# Patient Record
Sex: MALE | Race: WHITE | NOT HISPANIC OR LATINO | Employment: OTHER | URBAN - METROPOLITAN AREA
[De-identification: names, ages, dates, MRNs, and addresses within clinical notes are randomized per-mention and may not be internally consistent; named-entity substitution may affect disease eponyms.]

---

## 2023-03-29 LAB
ANION GAP IN SER/PLAS: 11 MMOL/L (ref 10–20)
CALCIUM (MG/DL) IN SER/PLAS: 9.4 MG/DL (ref 8.6–10.3)
CARBON DIOXIDE, TOTAL (MMOL/L) IN SER/PLAS: 30 MMOL/L (ref 21–32)
CHLORIDE (MMOL/L) IN SER/PLAS: 102 MMOL/L (ref 98–107)
CREATININE (MG/DL) IN SER/PLAS: 0.81 MG/DL (ref 0.5–1.3)
GFR MALE: >90 ML/MIN/1.73M2
GLUCOSE (MG/DL) IN SER/PLAS: 94 MG/DL (ref 74–99)
POTASSIUM (MMOL/L) IN SER/PLAS: 4.3 MMOL/L (ref 3.5–5.3)
SODIUM (MMOL/L) IN SER/PLAS: 139 MMOL/L (ref 136–145)
UREA NITROGEN (MG/DL) IN SER/PLAS: 11 MG/DL (ref 6–23)

## 2023-04-20 DIAGNOSIS — I10 PRIMARY HYPERTENSION: Primary | ICD-10-CM

## 2023-04-20 RX ORDER — LISINOPRIL 30 MG/1
1 TABLET ORAL DAILY
COMMUNITY
End: 2023-04-20 | Stop reason: SDUPTHER

## 2023-04-20 NOTE — TELEPHONE ENCOUNTER
Patient left voicemail on provider refill line for:    Name of medication & dose:  Lisinopril 30mg   Quantity & refills requested: as per last time  Amount of medication remaining:  about 1 week  If out of medication, for how long?      Last office visit:  10/20/22  Last labs (if applicable):    Future appointment?  5/1/23    Pharmacy verified.  [facility]   Allergies updated.       The patient was informed the requested medication request will be processed within 3 business days unless they are contacted by a staff member to advise otherwise.

## 2023-04-21 RX ORDER — LISINOPRIL 30 MG/1
30 TABLET ORAL DAILY
Qty: 90 TABLET | Refills: 0 | Status: SHIPPED | OUTPATIENT
Start: 2023-04-21 | End: 2023-05-01 | Stop reason: SDUPTHER

## 2023-05-01 ENCOUNTER — OFFICE VISIT (OUTPATIENT)
Dept: PRIMARY CARE | Facility: CLINIC | Age: 63
End: 2023-05-01
Payer: COMMERCIAL

## 2023-05-01 VITALS
OXYGEN SATURATION: 96 % | BODY MASS INDEX: 40.57 KG/M2 | SYSTOLIC BLOOD PRESSURE: 130 MMHG | WEIGHT: 295 LBS | DIASTOLIC BLOOD PRESSURE: 81 MMHG | HEART RATE: 81 BPM

## 2023-05-01 DIAGNOSIS — F41.9 ANXIETY: ICD-10-CM

## 2023-05-01 DIAGNOSIS — I10 PRIMARY HYPERTENSION: Primary | ICD-10-CM

## 2023-05-01 DIAGNOSIS — E66.01 OBESITY, MORBID, BMI 40.0-49.9 (MULTI): ICD-10-CM

## 2023-05-01 DIAGNOSIS — R63.5 WEIGHT GAIN: ICD-10-CM

## 2023-05-01 PROBLEM — R45.89 DEPRESSED MOOD: Status: ACTIVE | Noted: 2023-05-01

## 2023-05-01 PROBLEM — M79.10 MYALGIA: Status: RESOLVED | Noted: 2023-05-01 | Resolved: 2023-05-01

## 2023-05-01 PROBLEM — M99.09 SEGMENTAL AND SOMATIC DYSFUNCTION: Status: RESOLVED | Noted: 2023-05-01 | Resolved: 2023-05-01

## 2023-05-01 PROBLEM — M54.6 THORACIC SPINE PAIN: Status: RESOLVED | Noted: 2023-05-01 | Resolved: 2023-05-01

## 2023-05-01 PROBLEM — R79.89 ABNORMAL TSH: Status: RESOLVED | Noted: 2023-05-01 | Resolved: 2023-05-01

## 2023-05-01 PROBLEM — R79.89 LOW TESTOSTERONE IN MALE: Status: RESOLVED | Noted: 2023-05-01 | Resolved: 2023-05-01

## 2023-05-01 PROBLEM — M25.642 STIFFNESS OF FINGER JOINT OF LEFT HAND: Status: RESOLVED | Noted: 2023-05-01 | Resolved: 2023-05-01

## 2023-05-01 PROBLEM — M53.3 SACRAL BACK PAIN: Status: ACTIVE | Noted: 2023-05-01

## 2023-05-01 PROBLEM — H40.1131 PRIMARY OPEN ANGLE GLAUCOMA (POAG) OF BOTH EYES, MILD STAGE: Status: ACTIVE | Noted: 2023-05-01

## 2023-05-01 PROBLEM — F90.9 ADHD: Status: ACTIVE | Noted: 2023-05-01

## 2023-05-01 PROBLEM — M54.2 NECK PAIN: Status: RESOLVED | Noted: 2023-05-01 | Resolved: 2023-05-01

## 2023-05-01 PROBLEM — M79.9 POSTURAL STRAIN: Status: RESOLVED | Noted: 2023-05-01 | Resolved: 2023-05-01

## 2023-05-01 PROBLEM — M40.00 KYPHOSIS (ACQUIRED) (POSTURAL): Status: ACTIVE | Noted: 2023-05-01

## 2023-05-01 PROBLEM — N52.9 ERECTILE DYSFUNCTION: Status: ACTIVE | Noted: 2023-05-01

## 2023-05-01 PROBLEM — M89.9 SCAPULAR DYSFUNCTION: Status: RESOLVED | Noted: 2023-05-01 | Resolved: 2023-05-01

## 2023-05-01 PROCEDURE — 3075F SYST BP GE 130 - 139MM HG: CPT | Performed by: FAMILY MEDICINE

## 2023-05-01 PROCEDURE — 99214 OFFICE O/P EST MOD 30 MIN: CPT | Performed by: FAMILY MEDICINE

## 2023-05-01 PROCEDURE — 3079F DIAST BP 80-89 MM HG: CPT | Performed by: FAMILY MEDICINE

## 2023-05-01 PROCEDURE — 1036F TOBACCO NON-USER: CPT | Performed by: FAMILY MEDICINE

## 2023-05-01 RX ORDER — ESCITALOPRAM OXALATE 10 MG/1
1 TABLET ORAL DAILY
COMMUNITY
Start: 2022-04-18 | End: 2023-05-01 | Stop reason: SDUPTHER

## 2023-05-01 RX ORDER — ESCITALOPRAM OXALATE 10 MG/1
5 TABLET ORAL DAILY
Qty: 30 TABLET | Refills: 0 | Status: SHIPPED | OUTPATIENT
Start: 2023-05-01 | End: 2024-03-08 | Stop reason: ALTCHOICE

## 2023-05-01 RX ORDER — LISINOPRIL 30 MG/1
30 TABLET ORAL DAILY
Qty: 90 TABLET | Refills: 1 | Status: SHIPPED | OUTPATIENT
Start: 2023-05-01 | End: 2023-07-28 | Stop reason: SDUPTHER

## 2023-05-01 RX ORDER — TIMOLOL MALEATE 5 MG/ML
SOLUTION OPHTHALMIC
COMMUNITY
Start: 2022-06-23 | End: 2024-02-27 | Stop reason: SDUPTHER

## 2023-05-01 RX ORDER — SILDENAFIL 100 MG/1
TABLET, FILM COATED ORAL
COMMUNITY
End: 2024-03-08 | Stop reason: SDUPTHER

## 2023-05-01 RX ORDER — TRAVOPROST OPHTHALMIC SOLUTION 0.04 MG/ML
SOLUTION OPHTHALMIC
COMMUNITY
Start: 2021-12-08

## 2023-05-01 RX ORDER — SERTRALINE HYDROCHLORIDE 50 MG/1
50 TABLET, FILM COATED ORAL DAILY
Qty: 90 TABLET | Refills: 0 | Status: SHIPPED | OUTPATIENT
Start: 2023-05-01 | End: 2023-07-28 | Stop reason: SDUPTHER

## 2023-05-01 ASSESSMENT — PATIENT HEALTH QUESTIONNAIRE - PHQ9
2. FEELING DOWN, DEPRESSED OR HOPELESS: NOT AT ALL
1. LITTLE INTEREST OR PLEASURE IN DOING THINGS: NOT AT ALL
SUM OF ALL RESPONSES TO PHQ9 QUESTIONS 1 AND 2: 0

## 2023-07-28 ENCOUNTER — OFFICE VISIT (OUTPATIENT)
Dept: PRIMARY CARE | Facility: CLINIC | Age: 63
End: 2023-07-28
Payer: COMMERCIAL

## 2023-07-28 VITALS
WEIGHT: 287 LBS | SYSTOLIC BLOOD PRESSURE: 121 MMHG | DIASTOLIC BLOOD PRESSURE: 79 MMHG | BODY MASS INDEX: 39.47 KG/M2 | OXYGEN SATURATION: 96 % | HEART RATE: 71 BPM

## 2023-07-28 DIAGNOSIS — I10 PRIMARY HYPERTENSION: Primary | ICD-10-CM

## 2023-07-28 DIAGNOSIS — F41.9 ANXIETY: ICD-10-CM

## 2023-07-28 PROCEDURE — 99213 OFFICE O/P EST LOW 20 MIN: CPT | Performed by: FAMILY MEDICINE

## 2023-07-28 PROCEDURE — 3074F SYST BP LT 130 MM HG: CPT | Performed by: FAMILY MEDICINE

## 2023-07-28 PROCEDURE — 1036F TOBACCO NON-USER: CPT | Performed by: FAMILY MEDICINE

## 2023-07-28 PROCEDURE — 3078F DIAST BP <80 MM HG: CPT | Performed by: FAMILY MEDICINE

## 2023-07-28 RX ORDER — LISINOPRIL 30 MG/1
30 TABLET ORAL DAILY
Qty: 90 TABLET | Refills: 1 | Status: SHIPPED | OUTPATIENT
Start: 2023-07-28 | End: 2023-07-28

## 2023-07-28 RX ORDER — SERTRALINE HYDROCHLORIDE 100 MG/1
100 TABLET, FILM COATED ORAL DAILY
Qty: 90 TABLET | Refills: 1 | Status: SHIPPED | OUTPATIENT
Start: 2023-07-28 | End: 2023-07-28

## 2023-07-28 NOTE — PROGRESS NOTES
Subjective   Patient ID: Reji Lewis is a 63 y.o. male who presents for Hypertension (BP follow up).  He has been trying to walk more but he has not cut his screen time at all.  He has stopped the lexapro and has been on zoloft now for 3 months.  No side effects.  He thinks he has had improvement, but some of that has been CBD oil and also his wife has been better due to her own med adjustments, which helps his mood.  He is at least 25% improved but is leary of going up on the zoloft.  He wants to stay on the same dose, but also wants to increase.      He takes his BP meds daily.        Histories reviewed      Objective   /79   Pulse 71   Wt 130 kg (287 lb)   SpO2 96%   BMI 39.47 kg/m²    Physical Exam  Alert obese adult man, NAD  Affect pleasant.    Heart RRR no murmur  Lungs CTA bilat    Problem List Items Addressed This Visit       Anxiety    Relevant Medications    sertraline (Zoloft) 100 mg tablet    Primary hypertension - Primary    Relevant Medications    lisinopril 30 mg tablet

## 2023-08-23 PROBLEM — E66.812 CLASS 2 SEVERE OBESITY DUE TO EXCESS CALORIES WITH SERIOUS COMORBIDITY AND BODY MASS INDEX (BMI) OF 39.0 TO 39.9 IN ADULT: Status: ACTIVE | Noted: 2023-08-23

## 2023-08-23 PROBLEM — E66.01 CLASS 2 SEVERE OBESITY DUE TO EXCESS CALORIES WITH SERIOUS COMORBIDITY AND BODY MASS INDEX (BMI) OF 39.0 TO 39.9 IN ADULT (MULTI): Status: ACTIVE | Noted: 2023-08-23

## 2023-08-23 PROBLEM — I10 ESSENTIAL HYPERTENSION, BENIGN: Status: ACTIVE | Noted: 2023-08-23

## 2023-08-23 PROBLEM — G89.29 CHRONIC LEFT SHOULDER PAIN: Status: ACTIVE | Noted: 2023-08-23

## 2023-08-23 PROBLEM — M25.512 CHRONIC LEFT SHOULDER PAIN: Status: ACTIVE | Noted: 2023-08-23

## 2023-08-23 RX ORDER — DEXTROAMPHETAMINE SACCHARATE, AMPHETAMINE ASPARTATE MONOHYDRATE, DEXTROAMPHETAMINE SULFATE AND AMPHETAMINE SULFATE 7.5; 7.5; 7.5; 7.5 MG/1; MG/1; MG/1; MG/1
1 CAPSULE, EXTENDED RELEASE ORAL DAILY
COMMUNITY
End: 2024-03-08 | Stop reason: ALTCHOICE

## 2023-10-01 ASSESSMENT — ENCOUNTER SYMPTOMS
APPETITE CHANGE: 0
WOUND: 0
DIZZINESS: 0
NAUSEA: 0
DYSURIA: 0
SHORTNESS OF BREATH: 0
FEVER: 0
ENDOCRINE COMMENTS: HX HYPOTHYROIDISM
VOMITING: 0
WEAKNESS: 0
FATIGUE: 0
CONFUSION: 0

## 2023-10-01 NOTE — PROGRESS NOTES
"Subjective   Patient ID: Reji Lewis is a 63 y.o. male who presents today for the examination and treatment of pain involving their R shoulder, neck and low back pain.    This is a visit 8/20 of the calendar year. [facility].     [facility] - Reji presents today with neck and R shoulder pain. He notes he has been spending a lot of time sitting in a car/plane since traveling to [place], which is contributing to his neck/shoulder pain. Today's treatment will focus on his neck and R shoulder regions and check for any joint restrictions and/or muscular imbalances.     He will be seeing an ophthalmologist in January of 2024 for a follow up with his diagnosis of glaucoma.     Patient describes the pain as stiffness and tightness, and rates the current pain 5 out of 10 (10 being the worst).     INITIAL INTAKE/SUBJECTIVE 09/29/21: He explains that this pain started approximately 1 year ago when he started to notice right shoulder issues. He was scheduled for an MRI of the right shoulder when he was living in [place], however he was unable to tolerate the stress and the coordination of getting there and making it through the test. Pain then seemed to shift to the base of the neck and now it is more pronounced on the left side. Explains that he cannot lay on the right side because the left sided pain will get worse. He does have some \"tingling\" sensation at the left base of the neck but the CT junction as well as into the left first and second digits. He notices this specifically when looking down at his phone while in his recliner. The only treatment he has tried so far was a massage approximately 1 month ago which \"felt good\" and gave mild temporary relief. There is no current sharp pain even with active range of motion but he reports the sensation of \"stiffness in his vertebrae\".    Social history: Reji and his wife just moved here approximately 3 months ago to be closer to their son and daughter-in-law. He does note " that his pain has gotten worse around this timeframe likely due to stress which has come with moving and is wife starting a new job. 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     Past medical history: He does have high blood pressure and is medicated for this, ADD which she is medicated, does take baclofen as needed for muscle pain. No other significant past medical history reported including surgeries and chronic diseases. Low testosterone, gets hormone replacement. Hypothyroidism. Glaucoma.        Review of Systems   Constitutional:  Negative for appetite change, fatigue and fever.   HENT:  Negative for congestion and hearing loss.    Eyes:  Negative for visual disturbance.        Hx Glaucoma   Respiratory:  Negative for shortness of breath.    Cardiovascular:  Negative for chest pain.   Gastrointestinal:  Negative for nausea and vomiting.   Endocrine:        Hx Hypothyroidism   Genitourinary:  Negative for dysuria.   Skin:  Negative for rash and wound.   Neurological:  Negative for dizziness and weakness.   Psychiatric/Behavioral:  Negative for confusion.    All other systems reviewed and are negative.    Objective   Observation : normal gait and forward head posture. Slight difficulty with transitional movements. Forward flexed posture.     Physical Exam    Examination findings (palpation & ROM): BL suboccipitals, cervical paraspinals, scalenes, levator scapulae.          Segmental joint dysfunction was identified in the following areas using motion palpation and/or pain provocation assessment:  Cervical: C0-C5 (Supine, gentle impulse)  Thoracic: T4-T9 (prone)  Lumbopelvic: L3-S1, right and left SI joint (side-posture & drop)    Today's treatment:  Performed spinal manipulation to the regions of segmental dysfunction identified on examination using age-appropriate and injury specific force, and manual diversified technique.     Brief supine soft tissue  manipulation was performed including IASTM (instrument assisted soft tissue mobilization), MFR (Myofacial Release) and IC (ischemic compression) to the hypertonic paraspinal muscles including R>L suboccipitals, R>L cervical paraspinals, R>L scalenes, R>L levator scapulae to patient tolerance. Prone IC and MFR to BL thoracic/lumbar paraspinals, rhomboids, and middle trapezius, SIJ, and upper gluteals.     Treatment Plan:   The patient and I discussed the risks and benefits of chiropractic care. Based on the patient's subjective complaints along with the examination findings, it is advised that a course of chiropractic treatment by initiated. Consent for care was given both written and orally by the patient. The patient tolerated today's treatment with little or no additional discomfort and was instructed to contact the office for questions or concerns.     Treatment Frequency: Will see/treat patient every 2-4 weeks as therapeutic benefit is sustained, then frequency will be reduced to as needed for symptom management or as needed for acute flare-ups/exacerbation.     Please note: Voice-to-text software was used when completing this note.  While the note was proofread, portions may include grammatical errors.  Please contact me with any questions/concerns as it relates to these types of errors.

## 2023-10-02 ENCOUNTER — ALLIED HEALTH (OUTPATIENT)
Dept: INTEGRATIVE MEDICINE | Facility: CLINIC | Age: 63
End: 2023-10-02
Payer: COMMERCIAL

## 2023-10-02 DIAGNOSIS — M99.01 SEGMENTAL AND SOMATIC DYSFUNCTION OF CERVICAL REGION: ICD-10-CM

## 2023-10-02 DIAGNOSIS — M54.2 NECK PAIN: ICD-10-CM

## 2023-10-02 DIAGNOSIS — M79.9 POSTURAL STRAIN: ICD-10-CM

## 2023-10-02 DIAGNOSIS — M99.05 SEGMENTAL AND SOMATIC DYSFUNCTION OF PELVIC REGION: ICD-10-CM

## 2023-10-02 DIAGNOSIS — M79.10 MYALGIA: ICD-10-CM

## 2023-10-02 DIAGNOSIS — M99.00 SEGMENTAL AND SOMATIC DYSFUNCTION OF HEAD REGION: Primary | ICD-10-CM

## 2023-10-02 DIAGNOSIS — M25.511 ACUTE PAIN OF RIGHT SHOULDER: ICD-10-CM

## 2023-10-02 DIAGNOSIS — M99.04 SEGMENTAL AND SOMATIC DYSFUNCTION OF SACRAL REGION: ICD-10-CM

## 2023-10-02 DIAGNOSIS — M99.03 SEGMENTAL AND SOMATIC DYSFUNCTION OF LUMBAR REGION: ICD-10-CM

## 2023-10-02 DIAGNOSIS — M99.02 SEGMENTAL AND SOMATIC DYSFUNCTION OF THORACIC REGION: ICD-10-CM

## 2023-10-02 PROCEDURE — 98942 CHIROPRACTIC MANJ 5 REGIONS: CPT | Performed by: CHIROPRACTOR

## 2023-10-11 ENCOUNTER — PHARMACY VISIT (OUTPATIENT)
Dept: PHARMACY | Facility: CLINIC | Age: 63
End: 2023-10-11
Payer: COMMERCIAL

## 2023-10-11 PROCEDURE — RXMED WILLOW AMBULATORY MEDICATION CHARGE

## 2023-10-11 RX ORDER — SILDENAFIL 100 MG/1
100 TABLET, FILM COATED ORAL
Qty: 18 TABLET | Refills: 2 | OUTPATIENT
Start: 2022-10-20 | End: 2024-03-08 | Stop reason: SDUPTHER

## 2023-10-12 PROCEDURE — RXMED WILLOW AMBULATORY MEDICATION CHARGE

## 2023-10-27 ENCOUNTER — ALLIED HEALTH (OUTPATIENT)
Dept: INTEGRATIVE MEDICINE | Facility: CLINIC | Age: 63
End: 2023-10-27
Payer: COMMERCIAL

## 2023-10-27 DIAGNOSIS — M79.9 POSTURAL STRAIN: ICD-10-CM

## 2023-10-27 DIAGNOSIS — M54.50 RIGHT-SIDED LOW BACK PAIN WITHOUT SCIATICA, UNSPECIFIED CHRONICITY: ICD-10-CM

## 2023-10-27 DIAGNOSIS — M99.05 SEGMENTAL AND SOMATIC DYSFUNCTION OF PELVIC REGION: ICD-10-CM

## 2023-10-27 DIAGNOSIS — M99.04 SEGMENTAL AND SOMATIC DYSFUNCTION OF SACRAL REGION: ICD-10-CM

## 2023-10-27 DIAGNOSIS — M99.00 SEGMENTAL AND SOMATIC DYSFUNCTION OF HEAD REGION: Primary | ICD-10-CM

## 2023-10-27 DIAGNOSIS — M79.10 MYALGIA: ICD-10-CM

## 2023-10-27 DIAGNOSIS — M99.01 SEGMENTAL AND SOMATIC DYSFUNCTION OF CERVICAL REGION: ICD-10-CM

## 2023-10-27 DIAGNOSIS — M99.03 SEGMENTAL AND SOMATIC DYSFUNCTION OF LUMBAR REGION: ICD-10-CM

## 2023-10-27 DIAGNOSIS — M99.02 SEGMENTAL AND SOMATIC DYSFUNCTION OF THORACIC REGION: ICD-10-CM

## 2023-10-27 PROCEDURE — 98942 CHIROPRACTIC MANJ 5 REGIONS: CPT | Performed by: CHIROPRACTOR

## 2023-10-27 ASSESSMENT — ENCOUNTER SYMPTOMS
ENDOCRINE COMMENTS: HX HYPOTHYROIDISM
DYSURIA: 0
FATIGUE: 0
DIZZINESS: 0
SHORTNESS OF BREATH: 0
VOMITING: 0
FEVER: 0
NAUSEA: 0
APPETITE CHANGE: 0
CONFUSION: 0
WOUND: 0
WEAKNESS: 0

## 2023-10-27 NOTE — PROGRESS NOTES
"Subjective   Patient ID: Reji Lewis is a 63 y.o. male who presents today for the treatment of pain involving their R shoulder, neck and low back pain.    This is a visit 9/20 of the calendar year. [facility].     [facility] - Reji presents today with bilateral R>L low back pain. He notes he walked a medium sized dog earlier today as it was pulling him throughout the walk contributing to his low back pain. Reji has been utilizing exercise bands at home to help with his physical health as he notes at times he struggles with stairs and activities. He would like to focus treatment on his low back region and check for any joint restrictions and/or muscular imbalances. No additional injuries or changes in his medical history.     He will be seeing an ophthalmologist in January of 2024 for a follow up with his diagnosis of glaucoma.     Patient describes the pain as stiffness and tightness, and rates the current pain 5 out of 10 (10 being the worst).     Last treatment visit 10/2/23: Reji presents today with neck and R shoulder pain. He notes he has been spending a lot of time sitting in a car/plane since traveling to [place], which is contributing to his neck/shoulder pain. Today's treatment will focus on his neck and R shoulder regions and check for any joint restrictions and/or muscular imbalances.     INITIAL INTAKE/SUBJECTIVE 09/29/21: He explains that this pain started approximately 1 year ago when he started to notice right shoulder issues. He was scheduled for an MRI of the right shoulder when he was living in [place], however he was unable to tolerate the stress and the coordination of getting there and making it through the test. Pain then seemed to shift to the base of the neck and now it is more pronounced on the left side. Explains that he cannot lay on the right side because the left sided pain will get worse. He does have some \"tingling\" sensation at the left base of the neck but the CT junction as " "well as into the left first and second digits. He notices this specifically when looking down at his phone while in his recliner. The only treatment he has tried so far was a massage approximately 1 month ago which \"felt good\" and gave mild temporary relief. There is no current sharp pain even with active range of motion but he reports the sensation of \"stiffness in his vertebrae\".    Social history: Reji and his wife just moved here approximately 3 months ago to be closer to their son and daughter-in-law. He does note that his pain has gotten worse around this timeframe likely due to stress which has come with moving and is wife starting a new job. 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     Past medical history: He does have high blood pressure and is medicated for this, ADD which she is medicated, does take baclofen as needed for muscle pain. No other significant past medical history reported including surgeries and chronic diseases. Low testosterone, gets hormone replacement. Hypothyroidism. Glaucoma.      Review of Systems   Constitutional:  Negative for appetite change, fatigue and fever.   HENT:  Negative for congestion and hearing loss.    Eyes:  Negative for visual disturbance.        Hx Glaucoma   Respiratory:  Negative for shortness of breath.    Cardiovascular:  Negative for chest pain.   Gastrointestinal:  Negative for nausea and vomiting.   Endocrine:        Hx Hypothyroidism   Genitourinary:  Negative for dysuria.   Skin:  Negative for rash and wound.   Neurological:  Negative for dizziness and weakness.   Psychiatric/Behavioral:  Negative for confusion.    All other systems reviewed and are negative.    Objective   Observation : normal gait and forward head posture. Slight difficulty with transitional movements. Forward flexed posture.     Physical Exam  Examination findings (palpation & ROM): BL suboccipitals, cervical paraspinals, " scalenes, levator scapulae. Tenderness and hypertonicity over the R SIJ, R upper gluteals, R lumbar paraspinals.     Segmental joint dysfunction was identified in the following areas using motion palpation and/or pain provocation assessment:  Cervical: C0-C5 (Supine, gentle impulse)  Thoracic: T4-T9 (Prone)  Lumbopelvic: L3-S1, right SI joint (Side-posture & drop)    Today's treatment:  Performed spinal manipulation to the regions of segmental dysfunction identified on examination using age-appropriate and injury specific force, and manual diversified technique.     Brief prone soft tissue manipulation was performed including IASTM (instrument assisted soft tissue mobilization), MFR (Myofacial Release) and IC (ischemic compression) to the hypertonic paraspinal muscles including R>L SIJ, R>L lumbar/thoracic paraspinals, R>L upper gluteals, R>L QL to patient tolerance.     Discussed exercise and the importance of fitness and weight loss. He is motivated but not quite ready for change.     Treatment Plan:   The patient and I discussed the risks and benefits of chiropractic care. Based on the patient's subjective complaints along with the examination findings, it is advised that a course of chiropractic treatment by initiated. Consent for care was given both written and orally by the patient. The patient tolerated today's treatment with little or no additional discomfort and was instructed to contact the office for questions or concerns.     Treatment Frequency: Will see/treat patient every 2-4 weeks as therapeutic benefit is sustained, then frequency will be reduced to as needed for symptom management or as needed for acute flare-ups/exacerbation.     Please note: Voice-to-text software was used when completing this note.  While the note was proofread, portions may include grammatical errors.  Please contact me with any questions/concerns as it relates to these types of errors.   None

## 2024-01-03 DIAGNOSIS — I10 PRIMARY HYPERTENSION: ICD-10-CM

## 2024-01-04 PROCEDURE — RXMED WILLOW AMBULATORY MEDICATION CHARGE

## 2024-01-04 RX ORDER — LISINOPRIL 30 MG/1
30 TABLET ORAL
Qty: 90 TABLET | Refills: 0 | Status: SHIPPED | OUTPATIENT
Start: 2024-01-04 | End: 2024-03-08 | Stop reason: SDUPTHER

## 2024-01-10 ENCOUNTER — PHARMACY VISIT (OUTPATIENT)
Dept: PHARMACY | Facility: CLINIC | Age: 64
End: 2024-01-10
Payer: COMMERCIAL

## 2024-01-15 ENCOUNTER — ALLIED HEALTH (OUTPATIENT)
Dept: INTEGRATIVE MEDICINE | Facility: CLINIC | Age: 64
End: 2024-01-15
Payer: COMMERCIAL

## 2024-01-15 DIAGNOSIS — M79.9 POSTURAL STRAIN: ICD-10-CM

## 2024-01-15 DIAGNOSIS — M54.50 RIGHT-SIDED LOW BACK PAIN WITHOUT SCIATICA, UNSPECIFIED CHRONICITY: ICD-10-CM

## 2024-01-15 DIAGNOSIS — M79.10 MYALGIA: ICD-10-CM

## 2024-01-15 DIAGNOSIS — M53.3 SACRAL BACK PAIN: ICD-10-CM

## 2024-01-15 DIAGNOSIS — M99.00 SEGMENTAL AND SOMATIC DYSFUNCTION OF HEAD REGION: Primary | ICD-10-CM

## 2024-01-15 DIAGNOSIS — M99.02 SEGMENTAL AND SOMATIC DYSFUNCTION OF THORACIC REGION: ICD-10-CM

## 2024-01-15 DIAGNOSIS — M99.03 SEGMENTAL AND SOMATIC DYSFUNCTION OF LUMBAR REGION: ICD-10-CM

## 2024-01-15 DIAGNOSIS — M99.05 SEGMENTAL AND SOMATIC DYSFUNCTION OF PELVIC REGION: ICD-10-CM

## 2024-01-15 DIAGNOSIS — M99.04 SEGMENTAL AND SOMATIC DYSFUNCTION OF SACRAL REGION: ICD-10-CM

## 2024-01-15 DIAGNOSIS — M54.2 NECK PAIN: ICD-10-CM

## 2024-01-15 DIAGNOSIS — M99.01 SEGMENTAL AND SOMATIC DYSFUNCTION OF CERVICAL REGION: ICD-10-CM

## 2024-01-15 PROCEDURE — 98942 CHIROPRACTIC MANJ 5 REGIONS: CPT | Performed by: CHIROPRACTOR

## 2024-01-16 ASSESSMENT — ENCOUNTER SYMPTOMS
DYSURIA: 0
DIZZINESS: 0
APPETITE CHANGE: 0
ENDOCRINE COMMENTS: HX HYPOTHYROIDISM
VOMITING: 0
FEVER: 0
FATIGUE: 0
CONFUSION: 0
SHORTNESS OF BREATH: 0
WOUND: 0
NAUSEA: 0
WEAKNESS: 0

## 2024-01-16 NOTE — PROGRESS NOTES
Subjective   Patient ID: Reji Lewis is a 63 y.o. male who presents today for the treatment of pain involving their R shoulder, neck and low back pain.    This is a visit 1/20 of the calendar year. [facility].     HPI -  This is his first treatment and over 2 months.  He explains that he has gained a little weight over the holidays.  He seems to be moving a little more slowly and having more difficulty with transitional movements.  He is trying to strategize with his weight loss goals for 2024.  In the meantime, his chief complaint today is the lower back.  He has a lot of stiffness and tightness in this area.  He has also been avoiding activities that require him to stand and walk for an extended period of time due to pain.  We will focus treatment on the lower back today and also check full spine for any joint restrictions.  He does have an appointment with primary care on 1/18/2024.    No other specific changes to his past medical history.    Patient describes the pain as stiffness and tightness, and rates the current pain 5 out of 10 (10 being the worst).     Last treatment visit 10/27/23: Reji presents today with bilateral R>L low back pain. He notes he walked a medium sized dog earlier today as it was pulling him throughout the walk contributing to his low back pain. Reji has been utilizing exercise bands at home to help with his physical health as he notes at times he struggles with stairs and activities. He would like to focus treatment on his low back region and check for any joint restrictions and/or muscular imbalances. No additional injuries or changes in his medical history.     He will be seeing an ophthalmologist in January of 2024 for a follow up with his diagnosis of glaucoma.    10/2/23: Reji presents today with neck and R shoulder pain. He notes he has been spending a lot of time sitting in a car/plane since traveling to [place], which is contributing to his neck/shoulder pain.  "Today's treatment will focus on his neck and R shoulder regions and check for any joint restrictions and/or muscular imbalances.   _______  INITIAL INTAKE/SUBJECTIVE 09/29/21: He explains that this pain started approximately 1 year ago when he started to notice right shoulder issues. He was scheduled for an MRI of the right shoulder when he was living in [place], however he was unable to tolerate the stress and the coordination of getting there and making it through the test. Pain then seemed to shift to the base of the neck and now it is more pronounced on the left side. Explains that he cannot lay on the right side because the left sided pain will get worse. He does have some \"tingling\" sensation at the left base of the neck but the CT junction as well as into the left first and second digits. He notices this specifically when looking down at his phone while in his recliner. The only treatment he has tried so far was a massage approximately 1 month ago which \"felt good\" and gave mild temporary relief. There is no current sharp pain even with active range of motion but he reports the sensation of \"stiffness in his vertebrae\".    Social history: Reji and his wife just moved here approximately 3 months ago to be closer to their son and daughter-in-law. He does note that his pain has gotten worse around this timeframe likely due to stress which has come with moving and is wife starting a new job. 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     Past medical history: He does have high blood pressure and is medicated for this, ADD which she is medicated, does take baclofen as needed for muscle pain. No other significant past medical history reported including surgeries and chronic diseases. Low testosterone, gets hormone replacement. Hypothyroidism. Glaucoma.      Review of Systems   Constitutional:  Negative for appetite change, fatigue and fever.   HENT:  " Negative for congestion and hearing loss.    Eyes:  Negative for visual disturbance.        Hx Glaucoma   Respiratory:  Negative for shortness of breath.    Cardiovascular:  Negative for chest pain.   Gastrointestinal:  Negative for nausea and vomiting.   Endocrine:        Hx Hypothyroidism   Genitourinary:  Negative for dysuria.   Skin:  Negative for rash and wound.   Neurological:  Negative for dizziness and weakness.   Psychiatric/Behavioral:  Negative for confusion.    All other systems reviewed and are negative.    Objective   Observation : normal gait and forward head posture. Slight difficulty with transitional movements. Forward flexed posture.     Physical Exam  Examination findings (palpation & ROM): BL suboccipitals, cervical paraspinals, scalenes, levator scapulae. Tenderness and hypertonicity over the R SIJ, R upper gluteals, R lumbar paraspinals.     Segmental joint dysfunction was identified in the following areas using motion palpation and/or pain provocation assessment:  Cervical: C0-C5 (Supine, gentle impulse)  Thoracic: T4-T9 (Prone)  Lumbopelvic: L3-S1, right SI joint (Prone & drop)    Today's treatment:  Performed spinal manipulation to the regions of segmental dysfunction identified on examination using age-appropriate and injury specific force, and manual diversified technique.     Brief prone soft tissue manipulation was performed including IASTM (instrument assisted soft tissue mobilization), MFR (Myofacial Release) and IC (ischemic compression) to the hypertonic paraspinal muscles including R>L SIJ, R>L lumbar/thoracic paraspinals, R>L upper gluteals, R>L QL to patient tolerance.     Discussed exercise and the importance of fitness and weight loss. He is motivated but not quite ready for change.     Treatment Plan:   The patient and I discussed the risks and benefits of chiropractic care. Based on the patient's subjective complaints along with the examination findings, it is advised that a  course of chiropractic treatment by initiated. Consent for care was given both written and orally by the patient. The patient tolerated today's treatment with little or no additional discomfort and was instructed to contact the office for questions or concerns.     Treatment Frequency: Will see/treat patient every 2-4 weeks as therapeutic benefit is sustained, then frequency will be reduced to as needed for symptom management or as needed for acute flare-ups/exacerbation.     Please note: Voice-to-text software was used when completing this note.  While the note was proofread, portions may include grammatical errors.  Please contact me with any questions/concerns as it relates to these types of errors.

## 2024-01-22 PROCEDURE — RXMED WILLOW AMBULATORY MEDICATION CHARGE

## 2024-01-25 ENCOUNTER — PHARMACY VISIT (OUTPATIENT)
Dept: PHARMACY | Facility: CLINIC | Age: 64
End: 2024-01-25
Payer: COMMERCIAL

## 2024-02-27 ENCOUNTER — OFFICE VISIT (OUTPATIENT)
Dept: OPHTHALMOLOGY | Facility: CLINIC | Age: 64
End: 2024-02-27
Payer: COMMERCIAL

## 2024-02-27 DIAGNOSIS — H40.1131 PRIMARY OPEN ANGLE GLAUCOMA (POAG) OF BOTH EYES, MILD STAGE: Primary | ICD-10-CM

## 2024-02-27 PROCEDURE — 92083 EXTENDED VISUAL FIELD XM: CPT | Performed by: OPHTHALMOLOGY

## 2024-02-27 PROCEDURE — RXMED WILLOW AMBULATORY MEDICATION CHARGE

## 2024-02-27 PROCEDURE — 99214 OFFICE O/P EST MOD 30 MIN: CPT | Performed by: OPHTHALMOLOGY

## 2024-02-27 PROCEDURE — 92133 CPTRZD OPH DX IMG PST SGM ON: CPT | Performed by: OPHTHALMOLOGY

## 2024-02-27 RX ORDER — TRAVOPROST OPHTHALMIC SOLUTION 0.04 MG/ML
1 SOLUTION OPHTHALMIC NIGHTLY
Qty: 7.5 ML | Refills: 3 | Status: SHIPPED | OUTPATIENT
Start: 2024-02-27 | End: 2024-03-08 | Stop reason: SDUPTHER

## 2024-02-27 RX ORDER — TIMOLOL MALEATE 5 MG/ML
1 SOLUTION OPHTHALMIC DAILY
Qty: 30 ML | Refills: 3 | Status: SHIPPED | OUTPATIENT
Start: 2024-02-27 | End: 2025-02-26

## 2024-02-27 ASSESSMENT — CONF VISUAL FIELD
OS_SUPERIOR_NASAL_RESTRICTION: 0
OD_SUPERIOR_NASAL_RESTRICTION: 0
OS_INFERIOR_NASAL_RESTRICTION: 0
OS_INFERIOR_TEMPORAL_RESTRICTION: 0
OD_SUPERIOR_TEMPORAL_RESTRICTION: 0
OD_INFERIOR_NASAL_RESTRICTION: 0
OS_NORMAL: 1
OD_NORMAL: 1
OS_SUPERIOR_TEMPORAL_RESTRICTION: 0
OD_INFERIOR_TEMPORAL_RESTRICTION: 0
METHOD: COUNTING FINGERS

## 2024-02-27 ASSESSMENT — CUP TO DISC RATIO
OD_RATIO: .4
OS_RATIO: .3

## 2024-02-27 ASSESSMENT — TONOMETRY
IOP_METHOD: GOLDMANN APPLANATION
OD_IOP_MMHG: 21
OS_IOP_MMHG: 21

## 2024-02-27 ASSESSMENT — VISUAL ACUITY
METHOD: SNELLEN - LINEAR
OS_SC: 20/20
OD_SC: 20/20
OD_SC+: -1

## 2024-02-27 ASSESSMENT — PACHYMETRY
OD_CT(UM): 620
OS_CT(UM): 615

## 2024-02-27 ASSESSMENT — ENCOUNTER SYMPTOMS: EYES NEGATIVE: 1

## 2024-02-27 ASSESSMENT — EXTERNAL EXAM - LEFT EYE: OS_EXAM: NORMAL

## 2024-02-27 ASSESSMENT — EXTERNAL EXAM - RIGHT EYE: OD_EXAM: NORMAL

## 2024-02-27 ASSESSMENT — SLIT LAMP EXAM - LIDS
COMMENTS: GOOD POSITION
COMMENTS: GOOD POSITION

## 2024-02-27 NOTE — PROGRESS NOTES
primary open angle glaucoma, both eyes   mild stage   s/p ceiol +/- MIGS in [place] (likely goniotomy given gonioscopy appearance)   patient recalls tmax in 40s    history of nasal steroid use daily - now stopped   pachs thicker   gonio open  [facility] Visual Field - OU - Both Eyes          Right Eye  Findings include non-specific defects.     Left Eye  Findings include non-specific defects.          OCT, Optic Nerve - OU - Both Eyes          Right Eye  Images reviewed and comparison made to baseline.     Left Eye  Images reviewed and comparison made to baseline.     Notes  Stable thinning TO 2023 OS>OD, WORSE THAN 2021           prev meds: cosopt (self d/c`d), combigan (self d/c`d)   IOP remains well controlled, ok to monitor here   cont travatan qhs   continue timolol qam   f/u 6 months iop CHECK

## 2024-03-08 ENCOUNTER — OFFICE VISIT (OUTPATIENT)
Dept: PRIMARY CARE | Facility: CLINIC | Age: 64
End: 2024-03-08
Payer: COMMERCIAL

## 2024-03-08 VITALS
SYSTOLIC BLOOD PRESSURE: 119 MMHG | BODY MASS INDEX: 41.26 KG/M2 | HEART RATE: 72 BPM | DIASTOLIC BLOOD PRESSURE: 76 MMHG | OXYGEN SATURATION: 94 % | WEIGHT: 300 LBS

## 2024-03-08 DIAGNOSIS — I10 PRIMARY HYPERTENSION: Primary | ICD-10-CM

## 2024-03-08 DIAGNOSIS — Z13.1 SCREENING FOR DIABETES MELLITUS: ICD-10-CM

## 2024-03-08 DIAGNOSIS — R35.0 URINARY FREQUENCY: ICD-10-CM

## 2024-03-08 DIAGNOSIS — N52.9 ERECTILE DYSFUNCTION, UNSPECIFIED ERECTILE DYSFUNCTION TYPE: ICD-10-CM

## 2024-03-08 DIAGNOSIS — Z13.220 SCREENING FOR HYPERLIPIDEMIA: ICD-10-CM

## 2024-03-08 DIAGNOSIS — Q55.69 PENILE ANOMALY: ICD-10-CM

## 2024-03-08 DIAGNOSIS — E66.01 OBESITY, MORBID, BMI 40.0-49.9 (MULTI): ICD-10-CM

## 2024-03-08 PROCEDURE — 99213 OFFICE O/P EST LOW 20 MIN: CPT | Performed by: FAMILY MEDICINE

## 2024-03-08 PROCEDURE — 1036F TOBACCO NON-USER: CPT | Performed by: FAMILY MEDICINE

## 2024-03-08 PROCEDURE — 3078F DIAST BP <80 MM HG: CPT | Performed by: FAMILY MEDICINE

## 2024-03-08 PROCEDURE — RXMED WILLOW AMBULATORY MEDICATION CHARGE

## 2024-03-08 PROCEDURE — 3074F SYST BP LT 130 MM HG: CPT | Performed by: FAMILY MEDICINE

## 2024-03-08 RX ORDER — TADALAFIL 5 MG/1
5 TABLET ORAL DAILY
Qty: 90 TABLET | Refills: 1 | Status: SHIPPED | OUTPATIENT
Start: 2024-03-08 | End: 2025-03-08

## 2024-03-08 RX ORDER — LISINOPRIL 30 MG/1
30 TABLET ORAL
Qty: 90 TABLET | Refills: 1 | Status: SHIPPED | OUTPATIENT
Start: 2024-03-08 | End: 2025-03-08

## 2024-03-08 RX ORDER — SILDENAFIL 100 MG/1
100 TABLET, FILM COATED ORAL DAILY PRN
Qty: 18 TABLET | Refills: 3 | Status: SHIPPED | OUTPATIENT
Start: 2024-03-08

## 2024-03-08 ASSESSMENT — ENCOUNTER SYMPTOMS
LOSS OF SENSATION IN FEET: 0
OCCASIONAL FEELINGS OF UNSTEADINESS: 0
DEPRESSION: 0

## 2024-03-08 NOTE — PROGRESS NOTES
Subjective   Patient ID: Reji Lewis is a 63 y.o. male who presents for Hypertension (Patient is here for BP follow up and med refill.  He would also like a neurology referral. ).  He wants a UROLOGY referral for a few different reasons, frequency, a new hard spot on his penis he wants to have checked and ongoing ED.      He has recently tried to start exercising more, and trying to eat better to lose weight.  His issues are not walking enough, and drinking too much, about 20 drinks a week (whiskey).  He gets enough sleep.  He says there is no reason he could not walk more.  He aches all over when he is more active, but no focal pain.      Histories reviewed      Objective   /76   Pulse 72   Wt 136 kg (300 lb)   SpO2 94%   BMI 41.26 kg/m²    Physical Exam    Alert adult, NAD  Affect pleasant but a little flat  Heart RRR no murmur  Lungs CTA bilat      Problem List Items Addressed This Visit             ICD-10-CM    Erectile dysfunction N52.9    Relevant Medications    sildenafil (Viagra) 100 mg tablet    Other Relevant Orders    Referral to Urology    Primary hypertension - Primary I10    Relevant Medications    lisinopril 30 mg tablet    Other Relevant Orders    Basic Metabolic Panel    Obesity, morbid, BMI 40.0-49.9 (CMS/[facility]) E66.01     Other Visit Diagnoses         Codes    Urinary frequency     R35.0    Relevant Medications    tadalafil (Cialis) 5 mg tablet    Other Relevant Orders    Referral to Urology    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

## 2024-03-11 ENCOUNTER — PHARMACY VISIT (OUTPATIENT)
Dept: PHARMACY | Facility: CLINIC | Age: 64
End: 2024-03-11
Payer: COMMERCIAL

## 2024-03-11 PROCEDURE — RXMED WILLOW AMBULATORY MEDICATION CHARGE

## 2024-03-12 ENCOUNTER — PHARMACY VISIT (OUTPATIENT)
Dept: PHARMACY | Facility: CLINIC | Age: 64
End: 2024-03-12
Payer: COMMERCIAL

## 2024-04-16 ENCOUNTER — ALLIED HEALTH (OUTPATIENT)
Dept: INTEGRATIVE MEDICINE | Facility: CLINIC | Age: 64
End: 2024-04-16
Payer: COMMERCIAL

## 2024-04-16 DIAGNOSIS — M99.03 SEGMENTAL AND SOMATIC DYSFUNCTION OF LUMBAR REGION: ICD-10-CM

## 2024-04-16 DIAGNOSIS — M54.2 NECK PAIN: ICD-10-CM

## 2024-04-16 DIAGNOSIS — G89.29 CHRONIC BILATERAL THORACIC BACK PAIN: ICD-10-CM

## 2024-04-16 DIAGNOSIS — M99.05 SEGMENTAL AND SOMATIC DYSFUNCTION OF PELVIC REGION: ICD-10-CM

## 2024-04-16 DIAGNOSIS — M79.10 MYALGIA: ICD-10-CM

## 2024-04-16 DIAGNOSIS — M25.511 ACUTE PAIN OF RIGHT SHOULDER: ICD-10-CM

## 2024-04-16 DIAGNOSIS — M99.00 SEGMENTAL AND SOMATIC DYSFUNCTION OF HEAD REGION: Primary | ICD-10-CM

## 2024-04-16 DIAGNOSIS — M54.6 CHRONIC BILATERAL THORACIC BACK PAIN: ICD-10-CM

## 2024-04-16 DIAGNOSIS — M99.01 SEGMENTAL AND SOMATIC DYSFUNCTION OF CERVICAL REGION: ICD-10-CM

## 2024-04-16 DIAGNOSIS — M53.3 SACRAL BACK PAIN: ICD-10-CM

## 2024-04-16 DIAGNOSIS — M99.04 SEGMENTAL AND SOMATIC DYSFUNCTION OF SACRAL REGION: ICD-10-CM

## 2024-04-16 DIAGNOSIS — M99.02 SEGMENTAL AND SOMATIC DYSFUNCTION OF THORACIC REGION: ICD-10-CM

## 2024-04-16 PROCEDURE — 98942 CHIROPRACTIC MANJ 5 REGIONS: CPT | Performed by: CHIROPRACTOR

## 2024-04-16 ASSESSMENT — ENCOUNTER SYMPTOMS
FEVER: 0
WOUND: 0
DYSURIA: 0
SHORTNESS OF BREATH: 0
VOMITING: 0
CONFUSION: 0
APPETITE CHANGE: 0
ENDOCRINE COMMENTS: HX HYPOTHYROIDISM
DIZZINESS: 0
FATIGUE: 0
WEAKNESS: 0
NAUSEA: 0

## 2024-04-16 NOTE — PROGRESS NOTES
Subjective   Patient ID: Reji Lewis is a 63 y.o. male who presents today for the treatment of pain involving their R shoulder, neck and low back pain.    This is a visit 2/20 of the calendar year. [facility].     HPI -   he saw primary care on 3/8/2024 for an annual wellness visit.  Labs were ordered but have not yet been completed, he explains that the doctor wanted him to get them done just prior to their 6-month follow-up.  He has been taken off of his thyroid medication after seeing an endocrinologist.  He has not really noticed any significant change in his weight or health.    Today he like the focus to be on the neck upper back and shoulders which have been more stiff sore and tight than normal.  The lower back has not been too bad overall.    He is looking forward to a trip with his wife in June to [facility].    Last treatment 1/15/24: This is his first treatment and over 2 months.  He explains that he has gained a little weight over the holidays.  He seems to be moving a little more slowly and having more difficulty with transitional movements.  He is trying to strategize with his weight loss goals for 2024.  In the meantime, his chief complaint today is the lower back.  He has a lot of stiffness and tightness in this area.  He has also been avoiding activities that require him to stand and walk for an extended period of time due to pain.  We will focus treatment on the lower back today and also check full spine for any joint restrictions.  He does have an appointment with primary care on 1/18/2024.    No other specific changes to his past medical history.    Patient describes the pain as stiffness and tightness, and rates the current pain 5 out of 10 (10 being the worst).     10/27/23: Reji presents today with bilateral R>L low back pain. He notes he walked a medium sized dog earlier today as it was pulling him throughout the walk contributing to his low back pain. Reji has been utilizing  "exercise bands at home to help with his physical health as he notes at times he struggles with stairs and activities. He would like to focus treatment on his low back region and check for any joint restrictions and/or muscular imbalances. No additional injuries or changes in his medical history.     He will be seeing an ophthalmologist in January of 2024 for a follow up with his diagnosis of glaucoma.    10/2/23: Reji presents today with neck and R shoulder pain. He notes he has been spending a lot of time sitting in a car/plane since traveling to [place], which is contributing to his neck/shoulder pain. Today's treatment will focus on his neck and R shoulder regions and check for any joint restrictions and/or muscular imbalances.   _______  INITIAL INTAKE/SUBJECTIVE 09/29/21: He explains that this pain started approximately 1 year ago when he started to notice right shoulder issues. He was scheduled for an MRI of the right shoulder when he was living in [place], however he was unable to tolerate the stress and the coordination of getting there and making it through the test. Pain then seemed to shift to the base of the neck and now it is more pronounced on the left side. Explains that he cannot lay on the right side because the left sided pain will get worse. He does have some \"tingling\" sensation at the left base of the neck but the CT junction as well as into the left first and second digits. He notices this specifically when looking down at his phone while in his recliner. The only treatment he has tried so far was a massage approximately 1 month ago which \"felt good\" and gave mild temporary relief. There is no current sharp pain even with active range of motion but he reports the sensation of \"stiffness in his vertebrae\".    Social history: Reji and his wife just moved here approximately 3 months ago to be closer to their son and daughter-in-law. He does note that his pain has gotten worse around this " timeframe likely due to stress which has come with moving and is wife starting a new job. 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     Past medical history: He does have high blood pressure and is medicated for this, ADD which she is medicated, does take baclofen as needed for muscle pain. No other significant past medical history reported including surgeries and chronic diseases. Low testosterone, gets hormone replacement. Hypothyroidism. Glaucoma.      Review of Systems   Constitutional:  Negative for appetite change, fatigue and fever.   HENT:  Negative for congestion and hearing loss.    Eyes:  Negative for visual disturbance.        Hx Glaucoma   Respiratory:  Negative for shortness of breath.    Cardiovascular:  Negative for chest pain.        Hypertension   Gastrointestinal:  Negative for nausea and vomiting.   Endocrine:        Hx Hypothyroidism   Genitourinary:  Negative for dysuria.        ED/Urinary frequency (referral for Urology)    Skin:  Negative for rash and wound.   Neurological:  Negative for dizziness and weakness.   Psychiatric/Behavioral:  Negative for confusion.    All other systems reviewed and are negative.    Objective   Observation : normal gait and forward head posture. Slight difficulty with transitional movements. Forward flexed posture.     Physical Exam  Examination findings (palpation & ROM): BL suboccipitals, cervical paraspinals, scalenes, levator scapulae. Tenderness and hypertonicity over the R SIJ, R upper gluteals, R lumbar paraspinals.     Segmental joint dysfunction was identified in the following areas using motion palpation and/or pain provocation assessment:  Cervical: C0-C5 (Supine, gentle impulse)  Thoracic: T4-T9 (Prone)  Lumbopelvic: L3-S1, right SI joint (Prone & drop)    Today's treatment:  Performed spinal manipulation to the regions of segmental dysfunction identified on examination using age-appropriate and  injury specific force, and manual diversified technique.     Brief prone soft tissue manipulation was performed including IASTM (instrument assisted soft tissue mobilization), MFR (Myofacial Release) and IC (ischemic compression) to the hypertonic paraspinal muscles including R>L SIJ, R>L lumbar/thoracic paraspinals, R>L upper gluteals, R>L QL to patient tolerance.     Treatment Plan:   The patient and I discussed the risks and benefits of chiropractic care. Based on the patient's subjective complaints along with the examination findings, it is advised that a course of chiropractic treatment by initiated. Consent for care was given both written and orally by the patient. The patient tolerated today's treatment with little or no additional discomfort and was instructed to contact the office for questions or concerns.     Treatment Frequency: Will see/treat patient every 2-4 weeks as therapeutic benefit is sustained, then frequency will be reduced to as needed for symptom management or as needed for acute flare-ups/exacerbation.     Please note: Voice-to-text software was used when completing this note.  While the note was proofread, portions may include grammatical errors.  Please contact me with any questions/concerns as it relates to these types of errors.

## 2024-04-25 DIAGNOSIS — H40.1131 PRIMARY OPEN ANGLE GLAUCOMA (POAG) OF BOTH EYES, MILD STAGE: Primary | ICD-10-CM

## 2024-04-26 PROCEDURE — RXMED WILLOW AMBULATORY MEDICATION CHARGE

## 2024-04-26 RX ORDER — TRAVOPROST OPHTHALMIC SOLUTION 0.04 MG/ML
1 SOLUTION OPHTHALMIC NIGHTLY
Qty: 7.5 ML | Refills: 3 | Status: SHIPPED | OUTPATIENT
Start: 2024-04-26 | End: 2025-04-26

## 2024-05-10 ENCOUNTER — PHARMACY VISIT (OUTPATIENT)
Dept: PHARMACY | Facility: CLINIC | Age: 64
End: 2024-05-10
Payer: COMMERCIAL

## 2024-05-29 PROCEDURE — RXMED WILLOW AMBULATORY MEDICATION CHARGE

## 2024-05-30 ENCOUNTER — PHARMACY VISIT (OUTPATIENT)
Dept: PHARMACY | Facility: CLINIC | Age: 64
End: 2024-05-30
Payer: COMMERCIAL

## 2024-06-26 ENCOUNTER — APPOINTMENT (OUTPATIENT)
Dept: INTEGRATIVE MEDICINE | Facility: CLINIC | Age: 64
End: 2024-06-26
Payer: COMMERCIAL

## 2024-06-26 DIAGNOSIS — M54.2 NECK PAIN: ICD-10-CM

## 2024-06-26 DIAGNOSIS — M79.10 MYALGIA: ICD-10-CM

## 2024-06-26 DIAGNOSIS — M54.50 RIGHT-SIDED LOW BACK PAIN WITHOUT SCIATICA, UNSPECIFIED CHRONICITY: ICD-10-CM

## 2024-06-26 DIAGNOSIS — M99.02 SEGMENTAL AND SOMATIC DYSFUNCTION OF THORACIC REGION: ICD-10-CM

## 2024-06-26 DIAGNOSIS — M99.05 SEGMENTAL AND SOMATIC DYSFUNCTION OF PELVIC REGION: ICD-10-CM

## 2024-06-26 DIAGNOSIS — M99.03 SEGMENTAL AND SOMATIC DYSFUNCTION OF LUMBAR REGION: ICD-10-CM

## 2024-06-26 DIAGNOSIS — M53.3 SACRAL BACK PAIN: ICD-10-CM

## 2024-06-26 DIAGNOSIS — G89.29 CHRONIC BILATERAL THORACIC BACK PAIN: ICD-10-CM

## 2024-06-26 DIAGNOSIS — M54.6 CHRONIC BILATERAL THORACIC BACK PAIN: ICD-10-CM

## 2024-06-26 DIAGNOSIS — M79.9 POSTURAL STRAIN: ICD-10-CM

## 2024-06-26 DIAGNOSIS — M99.00 SEGMENTAL AND SOMATIC DYSFUNCTION OF HEAD REGION: Primary | ICD-10-CM

## 2024-06-26 DIAGNOSIS — M99.01 SEGMENTAL AND SOMATIC DYSFUNCTION OF CERVICAL REGION: ICD-10-CM

## 2024-06-26 DIAGNOSIS — M99.04 SEGMENTAL AND SOMATIC DYSFUNCTION OF SACRAL REGION: ICD-10-CM

## 2024-06-26 PROCEDURE — 98942 CHIROPRACTIC MANJ 5 REGIONS: CPT | Performed by: CHIROPRACTOR

## 2024-06-26 ASSESSMENT — ENCOUNTER SYMPTOMS
NAUSEA: 0
ENDOCRINE COMMENTS: HX HYPOTHYROIDISM
WOUND: 0
FEVER: 0
SHORTNESS OF BREATH: 0
APPETITE CHANGE: 0
VOMITING: 0
CONFUSION: 0
DYSURIA: 0
DIZZINESS: 0
FATIGUE: 0
WEAKNESS: 0

## 2024-06-26 NOTE — PROGRESS NOTES
Subjective   Patient ID: Reji Lewis is a 64 y.o. male who presents today for the treatment of pain involving their R shoulder, neck and low back pain.    This is a visit 3/20 of the calendar year. [facility].     HPI -   Overall not bad. Just got [place] [facility] last week. Still some skin flaking and peeling after sunburn to his forearms. Today she would like to focus on the lower back but check full spine.     Last treatment 4/16/24: he saw primary care on 3/8/2024 for an annual wellness visit.  Labs were ordered but have not yet been completed, he explains that the doctor wanted him to get them done just prior to their 6-month follow-up.  He has been taken off of his thyroid medication after seeing an endocrinologist.  He has not really noticed any significant change in his weight or health.    Today he like the focus to be on the neck upper back and shoulders which have been more stiff sore and tight than normal.  The lower back has not been too bad overall.    He is looking forward to a trip with his wife in June to [facility].    1/15/24: This is his first treatment and over 2 months.  He explains that he has gained a little weight over the holidays.  He seems to be moving a little more slowly and having more difficulty with transitional movements.  He is trying to strategize with his weight loss goals for 2024.  In the meantime, his chief complaint today is the lower back.  He has a lot of stiffness and tightness in this area.  He has also been avoiding activities that require him to stand and walk for an extended period of time due to pain.  We will focus treatment on the lower back today and also check full spine for any joint restrictions.  He does have an appointment with primary care on 1/18/2024.    No other specific changes to his past medical history.    Patient describes the pain as stiffness and tightness, and rates the current pain 5 out of 10 (10 being the worst).     10/27/23: Reji  "presents today with bilateral R>L low back pain. He notes he walked a medium sized dog earlier today as it was pulling him throughout the walk contributing to his low back pain. Reji has been utilizing exercise bands at home to help with his physical health as he notes at times he struggles with stairs and activities. He would like to focus treatment on his low back region and check for any joint restrictions and/or muscular imbalances. No additional injuries or changes in his medical history.     He will be seeing an ophthalmologist in January of 2024 for a follow up with his diagnosis of glaucoma.    10/2/23: Reji presents today with neck and R shoulder pain. He notes he has been spending a lot of time sitting in a car/plane since traveling to [place], which is contributing to his neck/shoulder pain. Today's treatment will focus on his neck and R shoulder regions and check for any joint restrictions and/or muscular imbalances.   _______  INITIAL INTAKE/SUBJECTIVE 09/29/21: He explains that this pain started approximately 1 year ago when he started to notice right shoulder issues. He was scheduled for an MRI of the right shoulder when he was living in [place], however he was unable to tolerate the stress and the coordination of getting there and making it through the test. Pain then seemed to shift to the base of the neck and now it is more pronounced on the left side. Explains that he cannot lay on the right side because the left sided pain will get worse. He does have some \"tingling\" sensation at the left base of the neck but the CT junction as well as into the left first and second digits. He notices this specifically when looking down at his phone while in his recliner. The only treatment he has tried so far was a massage approximately 1 month ago which \"felt good\" and gave mild temporary relief. There is no current sharp pain even with active range of motion but he reports the sensation of \"stiffness in " "his vertebrae\".    Social history: Reji and his wife just moved here approximately 3 months ago to be closer to their son and daughter-in-law. He does note that his pain has gotten worse around this timeframe likely due to stress which has come with moving and is wife starting a new job. 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     Past medical history: He does have high blood pressure and is medicated for this, ADD which she is medicated, does take baclofen as needed for muscle pain. No other significant past medical history reported including surgeries and chronic diseases. Low testosterone, gets hormone replacement. Hypothyroidism. Glaucoma.      Review of Systems   Constitutional:  Negative for appetite change, fatigue and fever.   HENT:  Negative for congestion and hearing loss.    Eyes:  Negative for visual disturbance.        Hx Glaucoma   Respiratory:  Negative for shortness of breath.    Cardiovascular:  Negative for chest pain.        Hypertension   Gastrointestinal:  Negative for nausea and vomiting.   Endocrine:        Hx Hypothyroidism   Genitourinary:  Negative for dysuria.        ED/Urinary frequency (referral for Urology)    Skin:  Negative for rash and wound.   Neurological:  Negative for dizziness and weakness.   Psychiatric/Behavioral:  Negative for confusion.    All other systems reviewed and are negative.    Objective   Observation : normal gait and forward head posture. Slight difficulty with transitional movements. Forward flexed posture.     Physical Exam  Examination findings (palpation & ROM): BL suboccipitals, cervical paraspinals, scalenes, levator scapulae. Tenderness and hypertonicity over the R SIJ, R upper gluteals, R hip flexors, BL lumbar paraspinals.     Segmental joint dysfunction was identified in the following areas using motion palpation and/or pain provocation assessment:  Cervical: C0-C5 (Supine, gentle " impulse)  Thoracic: T4-T9 (Prone)  Lumbopelvic: L3-S1, right SI joint (Prone & drop)    Today's treatment:  Performed spinal manipulation to the regions of segmental dysfunction identified on examination using age-appropriate and injury specific force, and manual diversified technique.     Brief prone soft tissue manipulation was performed including IASTM (instrument assisted soft tissue mobilization), MFR (Myofacial Release) and IC (ischemic compression) to the hypertonic paraspinal muscles including R>L SIJ, R>L lumbar/thoracic paraspinals, R>L upper gluteals, R>L QL to patient tolerance.     Treatment Plan:   The patient and I discussed the risks and benefits of chiropractic care. Based on the patient's subjective complaints along with the examination findings, it is advised that a course of chiropractic treatment by initiated. Consent for care was given both written and orally by the patient. The patient tolerated today's treatment with little or no additional discomfort and was instructed to contact the office for questions or concerns.     Treatment Frequency: Will see/treat patient every 2-4 weeks as therapeutic benefit is sustained, then frequency will be reduced to as needed for symptom management or as needed for acute flare-ups/exacerbation.     Please note: Voice-to-text software was used when completing this note.  While the note was proofread, portions may include grammatical errors.  Please contact me with any questions/concerns as it relates to these types of errors.

## 2024-07-15 PROCEDURE — RXMED WILLOW AMBULATORY MEDICATION CHARGE

## 2024-07-16 ENCOUNTER — PHARMACY VISIT (OUTPATIENT)
Dept: PHARMACY | Facility: CLINIC | Age: 64
End: 2024-07-16
Payer: COMMERCIAL

## 2024-08-12 PROCEDURE — RXMED WILLOW AMBULATORY MEDICATION CHARGE

## 2024-08-19 ENCOUNTER — PHARMACY VISIT (OUTPATIENT)
Dept: PHARMACY | Facility: CLINIC | Age: 64
End: 2024-08-19
Payer: COMMERCIAL

## 2024-08-21 ENCOUNTER — LAB (OUTPATIENT)
Dept: LAB | Facility: LAB | Age: 64
End: 2024-08-21
Payer: COMMERCIAL

## 2024-08-21 DIAGNOSIS — Z13.220 SCREENING FOR HYPERLIPIDEMIA: ICD-10-CM

## 2024-08-21 DIAGNOSIS — Z13.1 SCREENING FOR DIABETES MELLITUS: ICD-10-CM

## 2024-08-21 DIAGNOSIS — I10 PRIMARY HYPERTENSION: ICD-10-CM

## 2024-08-21 PROCEDURE — 80048 BASIC METABOLIC PNL TOTAL CA: CPT

## 2024-08-21 PROCEDURE — 80061 LIPID PANEL: CPT

## 2024-08-21 PROCEDURE — 36415 COLL VENOUS BLD VENIPUNCTURE: CPT

## 2024-08-21 PROCEDURE — 83036 HEMOGLOBIN GLYCOSYLATED A1C: CPT

## 2024-08-22 LAB
ANION GAP SERPL CALC-SCNC: 15 MMOL/L (ref 10–20)
BUN SERPL-MCNC: 18 MG/DL (ref 6–23)
CALCIUM SERPL-MCNC: 9.4 MG/DL (ref 8.6–10.6)
CHLORIDE SERPL-SCNC: 101 MMOL/L (ref 98–107)
CHOLEST SERPL-MCNC: 244 MG/DL (ref 0–199)
CHOLESTEROL/HDL RATIO: 3.2
CO2 SERPL-SCNC: 28 MMOL/L (ref 21–32)
CREAT SERPL-MCNC: 1.03 MG/DL (ref 0.5–1.3)
EGFRCR SERPLBLD CKD-EPI 2021: 81 ML/MIN/1.73M*2
EST. AVERAGE GLUCOSE BLD GHB EST-MCNC: 105 MG/DL
GLUCOSE SERPL-MCNC: 94 MG/DL (ref 74–99)
HBA1C MFR BLD: 5.3 %
HDLC SERPL-MCNC: 76.6 MG/DL
LDLC SERPL CALC-MCNC: 146 MG/DL
NON HDL CHOLESTEROL: 167 MG/DL (ref 0–149)
POTASSIUM SERPL-SCNC: 4.6 MMOL/L (ref 3.5–5.3)
SODIUM SERPL-SCNC: 139 MMOL/L (ref 136–145)
TRIGL SERPL-MCNC: 106 MG/DL (ref 0–149)
VLDL: 21 MG/DL (ref 0–40)

## 2024-08-27 ENCOUNTER — APPOINTMENT (OUTPATIENT)
Dept: OPHTHALMOLOGY | Facility: CLINIC | Age: 64
End: 2024-08-27
Payer: COMMERCIAL

## 2024-08-27 DIAGNOSIS — H40.1131 PRIMARY OPEN ANGLE GLAUCOMA (POAG) OF BOTH EYES, MILD STAGE: Primary | ICD-10-CM

## 2024-08-27 PROCEDURE — RXMED WILLOW AMBULATORY MEDICATION CHARGE

## 2024-08-27 PROCEDURE — 99214 OFFICE O/P EST MOD 30 MIN: CPT | Performed by: OPHTHALMOLOGY

## 2024-08-27 RX ORDER — TIMOLOL MALEATE 5 MG/ML
1 SOLUTION/ DROPS OPHTHALMIC DAILY
Qty: 15 ML | Refills: 3 | Status: SHIPPED | OUTPATIENT
Start: 2024-08-27 | End: 2025-08-27

## 2024-08-27 ASSESSMENT — VISUAL ACUITY
OS_SC: 20/20
OD_SC: 20/20
METHOD: SNELLEN - LINEAR

## 2024-08-27 ASSESSMENT — TONOMETRY
IOP_METHOD: GOLDMANN APPLANATION
OD_IOP_MMHG: 18
OS_IOP_MMHG: 18

## 2024-08-27 ASSESSMENT — EXTERNAL EXAM - LEFT EYE: OS_EXAM: NORMAL

## 2024-08-27 ASSESSMENT — EXTERNAL EXAM - RIGHT EYE: OD_EXAM: NORMAL

## 2024-08-27 ASSESSMENT — PACHYMETRY
OS_CT(UM): 615
OD_CT(UM): 620

## 2024-08-27 ASSESSMENT — SLIT LAMP EXAM - LIDS
COMMENTS: GOOD POSITION
COMMENTS: GOOD POSITION

## 2024-08-27 NOTE — PROGRESS NOTES
primary open angle glaucoma, both eyes   mild stage   s/p ceiol +/- MIGS in [place] (likely goniotomy given gonioscopy appearance)   patient recalls tmax in 40s    history of nasal steroid use daily - now stopped   pachs thicker   gonio open  Mary Visual Field - OU - Both Eyes          Right Eye  Findings include non-specific defects.     Left Eye  Findings include non-specific defects.          OCT, Optic Nerve - OU - Both Eyes          Right Eye  Images reviewed and comparison made to baseline.     Left Eye  Images reviewed and comparison made to baseline.     Notes  Stable thinning TO 2023 OS>OD, WORSE THAN 2021           prev meds: cosopt (self d/c`d), combigan (self d/c`d)   IOP remains well controlled, ok to monitor here   cont travatan qhs   continue timolol qam   f/u 6 months DFE/OCT RNFL

## 2024-08-30 DIAGNOSIS — R35.0 URINARY FREQUENCY: ICD-10-CM

## 2024-08-30 PROCEDURE — RXMED WILLOW AMBULATORY MEDICATION CHARGE

## 2024-08-30 RX ORDER — TADALAFIL 5 MG/1
5 TABLET ORAL DAILY
Qty: 90 TABLET | Refills: 0 | Status: SHIPPED | OUTPATIENT
Start: 2024-08-30 | End: 2025-08-30

## 2024-09-03 ENCOUNTER — PHARMACY VISIT (OUTPATIENT)
Dept: PHARMACY | Facility: CLINIC | Age: 64
End: 2024-09-03
Payer: COMMERCIAL

## 2024-09-19 ENCOUNTER — APPOINTMENT (OUTPATIENT)
Dept: PRIMARY CARE | Facility: CLINIC | Age: 64
End: 2024-09-19
Payer: COMMERCIAL

## 2024-09-19 VITALS
WEIGHT: 311 LBS | BODY MASS INDEX: 42.77 KG/M2 | SYSTOLIC BLOOD PRESSURE: 125 MMHG | HEART RATE: 75 BPM | OXYGEN SATURATION: 95 % | DIASTOLIC BLOOD PRESSURE: 87 MMHG

## 2024-09-19 DIAGNOSIS — Z72.820 POOR SLEEP: Primary | ICD-10-CM

## 2024-09-19 DIAGNOSIS — F41.9 ANXIETY: ICD-10-CM

## 2024-09-19 DIAGNOSIS — I10 PRIMARY HYPERTENSION: ICD-10-CM

## 2024-09-19 DIAGNOSIS — R35.0 URINARY FREQUENCY: ICD-10-CM

## 2024-09-19 PROCEDURE — RXMED WILLOW AMBULATORY MEDICATION CHARGE

## 2024-09-19 PROCEDURE — 3079F DIAST BP 80-89 MM HG: CPT | Performed by: FAMILY MEDICINE

## 2024-09-19 PROCEDURE — 3074F SYST BP LT 130 MM HG: CPT | Performed by: FAMILY MEDICINE

## 2024-09-19 PROCEDURE — 1036F TOBACCO NON-USER: CPT | Performed by: FAMILY MEDICINE

## 2024-09-19 PROCEDURE — 1160F RVW MEDS BY RX/DR IN RCRD: CPT | Performed by: FAMILY MEDICINE

## 2024-09-19 PROCEDURE — 99213 OFFICE O/P EST LOW 20 MIN: CPT | Performed by: FAMILY MEDICINE

## 2024-09-19 PROCEDURE — 1159F MED LIST DOCD IN RCRD: CPT | Performed by: FAMILY MEDICINE

## 2024-09-19 RX ORDER — TADALAFIL 5 MG/1
5 TABLET ORAL DAILY
Qty: 90 TABLET | Refills: 1 | Status: SHIPPED | OUTPATIENT
Start: 2024-09-19 | End: 2025-09-19

## 2024-09-19 RX ORDER — LISINOPRIL 30 MG/1
30 TABLET ORAL
Qty: 90 TABLET | Refills: 1 | Status: SHIPPED | OUTPATIENT
Start: 2024-09-19 | End: 2025-09-19

## 2024-09-19 RX ORDER — HYDROXYZINE HYDROCHLORIDE 10 MG/1
10-20 TABLET, FILM COATED ORAL 4 TIMES DAILY
Qty: 90 TABLET | Refills: 1 | Status: SHIPPED | OUTPATIENT
Start: 2024-09-19 | End: 2024-10-19

## 2024-09-19 NOTE — PROGRESS NOTES
Subjective   Patient ID: Reji Lewis is a 64 y.o. male who presents for lab work (Patient is here to go over lab work. ).  He has not really changed any of his lifestyle habits and he has continued to gain weight.  He remains very and active by his own admission.    He would like to try hydroxyzine for anxiety if possible.  His wife takes it and it helps her.  He has tried it for sleep and he sleeps much better.  He thinks it would help his mood in the day if he slept better.  Histories reviewed      Objective   /87   Pulse 75   Wt 141 kg (311 lb)   SpO2 95%   BMI 42.77 kg/m²    Physical Exam    Alert adult, NAD  Affect pleasant  Heart RRR no murmur  Lungs CTA bilat    Recent labs reviewed with patient      Problem List Items Addressed This Visit             ICD-10-CM    Anxiety F41.9    Relevant Medications    hydrOXYzine HCL (Atarax) 10 mg tablet    Primary hypertension I10    Relevant Medications    lisinopril 30 mg tablet     Other Visit Diagnoses         Codes    Poor sleep    -  Primary Z72.820    Relevant Medications    hydrOXYzine HCL (Atarax) 10 mg tablet    Urinary frequency     R35.0    Relevant Medications    tadalafil (Cialis) 5 mg tablet

## 2024-09-27 ENCOUNTER — PHARMACY VISIT (OUTPATIENT)
Dept: PHARMACY | Facility: CLINIC | Age: 64
End: 2024-09-27
Payer: COMMERCIAL

## 2024-11-18 ENCOUNTER — PHARMACY VISIT (OUTPATIENT)
Dept: PHARMACY | Facility: CLINIC | Age: 64
End: 2024-11-18
Payer: COMMERCIAL

## 2024-11-18 PROCEDURE — RXMED WILLOW AMBULATORY MEDICATION CHARGE

## 2024-12-01 PROCEDURE — RXMED WILLOW AMBULATORY MEDICATION CHARGE

## 2024-12-16 ENCOUNTER — PHARMACY VISIT (OUTPATIENT)
Dept: PHARMACY | Facility: CLINIC | Age: 64
End: 2024-12-16
Payer: COMMERCIAL

## 2025-01-11 DIAGNOSIS — Z72.820 POOR SLEEP: ICD-10-CM

## 2025-01-11 DIAGNOSIS — F41.9 ANXIETY: ICD-10-CM

## 2025-01-11 RX ORDER — HYDROXYZINE HYDROCHLORIDE 10 MG/1
10-20 TABLET, FILM COATED ORAL 4 TIMES DAILY
Qty: 90 TABLET | Refills: 1 | Status: CANCELLED | OUTPATIENT
Start: 2025-01-11 | End: 2025-02-10

## 2025-01-12 PROCEDURE — RXMED WILLOW AMBULATORY MEDICATION CHARGE

## 2025-01-13 DIAGNOSIS — F41.9 ANXIETY: ICD-10-CM

## 2025-01-13 DIAGNOSIS — Z72.820 POOR SLEEP: ICD-10-CM

## 2025-01-13 PROCEDURE — RXMED WILLOW AMBULATORY MEDICATION CHARGE

## 2025-01-13 RX ORDER — HYDROXYZINE HYDROCHLORIDE 10 MG/1
10-20 TABLET, FILM COATED ORAL EVERY 8 HOURS PRN
Qty: 90 TABLET | Refills: 0 | Status: SHIPPED | OUTPATIENT
Start: 2025-01-13 | End: 2025-02-12

## 2025-01-14 ENCOUNTER — PHARMACY VISIT (OUTPATIENT)
Dept: PHARMACY | Facility: CLINIC | Age: 65
End: 2025-01-14
Payer: COMMERCIAL

## 2025-02-14 PROCEDURE — RXMED WILLOW AMBULATORY MEDICATION CHARGE

## 2025-02-19 ENCOUNTER — PHARMACY VISIT (OUTPATIENT)
Dept: PHARMACY | Facility: CLINIC | Age: 65
End: 2025-02-19
Payer: COMMERCIAL

## 2025-02-25 ENCOUNTER — APPOINTMENT (OUTPATIENT)
Dept: OPHTHALMOLOGY | Facility: CLINIC | Age: 65
End: 2025-02-25
Payer: COMMERCIAL

## 2025-02-25 DIAGNOSIS — H40.1131 PRIMARY OPEN ANGLE GLAUCOMA (POAG) OF BOTH EYES, MILD STAGE: Primary | ICD-10-CM

## 2025-02-25 PROCEDURE — G2211 COMPLEX E/M VISIT ADD ON: HCPCS | Performed by: OPHTHALMOLOGY

## 2025-02-25 PROCEDURE — 92133 CPTRZD OPH DX IMG PST SGM ON: CPT | Performed by: OPHTHALMOLOGY

## 2025-02-25 PROCEDURE — 99214 OFFICE O/P EST MOD 30 MIN: CPT | Performed by: OPHTHALMOLOGY

## 2025-02-25 ASSESSMENT — ENCOUNTER SYMPTOMS
CONSTITUTIONAL NEGATIVE: 0
HEMATOLOGIC/LYMPHATIC NEGATIVE: 0
EYES NEGATIVE: 1
GASTROINTESTINAL NEGATIVE: 0
MUSCULOSKELETAL NEGATIVE: 0
ALLERGIC/IMMUNOLOGIC NEGATIVE: 0
CARDIOVASCULAR NEGATIVE: 0
ENDOCRINE NEGATIVE: 0
RESPIRATORY NEGATIVE: 0
NEUROLOGICAL NEGATIVE: 0
PSYCHIATRIC NEGATIVE: 0

## 2025-02-25 ASSESSMENT — EXTERNAL EXAM - LEFT EYE: OS_EXAM: NORMAL

## 2025-02-25 ASSESSMENT — CUP TO DISC RATIO
OD_RATIO: .4
OS_RATIO: .3

## 2025-02-25 ASSESSMENT — CONF VISUAL FIELD
OD_SUPERIOR_TEMPORAL_RESTRICTION: 0
OS_SUPERIOR_NASAL_RESTRICTION: 0
OS_NORMAL: 1
OD_SUPERIOR_NASAL_RESTRICTION: 0
OS_INFERIOR_TEMPORAL_RESTRICTION: 0
OD_NORMAL: 1
OD_INFERIOR_NASAL_RESTRICTION: 0
OS_INFERIOR_NASAL_RESTRICTION: 0
OS_SUPERIOR_TEMPORAL_RESTRICTION: 0
OD_INFERIOR_TEMPORAL_RESTRICTION: 0

## 2025-02-25 ASSESSMENT — EXTERNAL EXAM - RIGHT EYE: OD_EXAM: NORMAL

## 2025-02-25 ASSESSMENT — PACHYMETRY
OD_CT(UM): 620
OS_CT(UM): 615

## 2025-02-25 ASSESSMENT — VISUAL ACUITY
OD_SC: 20/20
METHOD: SNELLEN - LINEAR
OS_SC: 20/20

## 2025-02-25 ASSESSMENT — TONOMETRY
OS_IOP_MMHG: 17
IOP_METHOD: GOLDMANN APPLANATION
OD_IOP_MMHG: 16

## 2025-02-25 ASSESSMENT — SLIT LAMP EXAM - LIDS
COMMENTS: GOOD POSITION
COMMENTS: GOOD POSITION

## 2025-02-25 NOTE — PROGRESS NOTES
primary open angle glaucoma, both eyes   mild stage   s/p ceiol +/- MIGS in [place] (likely goniotomy given gonioscopy appearance)   patient recalls tmax in 40s    history of nasal steroid use daily - now stopped   pachs thicker   gonio open  Majority of progression happened in 21 when patient was off therapy presented with IOP in 30s/40s    Mary Visual Field - OU - Both Eyes          Right Eye  Findings include non-specific defects.     Left Eye  Findings include non-specific defects.          OCT, Optic Nerve - OU - Both Eyes          Thinning OS>od, progressive from 21 but stable to 23              prev meds: cosopt (self d/c`d), combigan (self d/c`d)   IOP remains well controlled, ok to monitor here   cont travatan qhs   continue timolol qam   f/u 6 months HVF 24-2 and IOP check/oct mac

## 2025-02-26 ENCOUNTER — APPOINTMENT (OUTPATIENT)
Dept: INTEGRATIVE MEDICINE | Facility: CLINIC | Age: 65
End: 2025-02-26
Payer: COMMERCIAL

## 2025-02-26 DIAGNOSIS — M54.6 CHRONIC BILATERAL THORACIC BACK PAIN: ICD-10-CM

## 2025-02-26 DIAGNOSIS — M79.10 MYALGIA: ICD-10-CM

## 2025-02-26 DIAGNOSIS — M99.02 SEGMENTAL AND SOMATIC DYSFUNCTION OF THORACIC REGION: ICD-10-CM

## 2025-02-26 DIAGNOSIS — M99.01 SEGMENTAL AND SOMATIC DYSFUNCTION OF CERVICAL REGION: Primary | ICD-10-CM

## 2025-02-26 DIAGNOSIS — M54.2 NECK PAIN: ICD-10-CM

## 2025-02-26 DIAGNOSIS — M99.04 SEGMENTAL AND SOMATIC DYSFUNCTION OF SACRAL REGION: ICD-10-CM

## 2025-02-26 DIAGNOSIS — G89.29 CHRONIC BILATERAL THORACIC BACK PAIN: ICD-10-CM

## 2025-02-26 DIAGNOSIS — M53.3 SACRAL BACK PAIN: ICD-10-CM

## 2025-02-26 DIAGNOSIS — M99.03 SEGMENTAL AND SOMATIC DYSFUNCTION OF LUMBAR REGION: ICD-10-CM

## 2025-02-26 PROCEDURE — 98941 CHIROPRACT MANJ 3-4 REGIONS: CPT

## 2025-02-26 NOTE — PROGRESS NOTES
"Subjective   Patient ID: Reji Lewis is a 64 y.o. male who presents February 26, 2025 for chiropractic care.     (1/20) [facility]    Today, the patient rates their degree of pain as a 4 out of 10 on the numeric pain rating scale.     Reji Lewis returns today for continued chiropractic care. He was previously in the care of my colleague, Dr. Edwards, who has since left the practice. Reji has noticed his upper back and neck has been worsening again since his last appointment. He has gained some weight and hasn't been as active as he would like to be, and he feels like working on feeling better will help motivate him to start being more active again. We will follow up in one month.   _______________________________________________________  Last visit - Dr. Edwards (6/26/2024):  Overall not bad. Just got back from [place] last week. Still some skin flaking and peeling after sunburn to his forearms. Today she would like to focus on the lower back but check full spine.   ___________________________________________________________  INITIAL INTAKE/SUBJECTIVE - Dr. Edwards (09/29/21):   He explains that this pain started approximately 1 year ago when he started to notice right shoulder issues. He was scheduled for an MRI of the right shoulder when he was living in [place], however he was unable to tolerate the stress and the coordination of getting there and making it through the test. Pain then seemed to shift to the base of the neck and now it is more pronounced on the left side. Explains that he cannot lay on the right side because the left sided pain will get worse. He does have some \"tingling\" sensation at the left base of the neck but the CT junction as well as into the left first and second digits. He notices this specifically when looking down at his phone while in his recliner. The only treatment he has tried so far was a massage approximately 1 month ago which \"felt good\" and gave mild temporary relief. There is " "no current sharp pain even with active range of motion but he reports the sensation of \"stiffness in his vertebrae\".     Social history: Reji and his wife just moved here approximately 3 months ago to be closer to their son and daughter-in-law. He does note that his pain has gotten worse around this timeframe likely due to stress which has come with moving and is wife starting a new job. 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      Past medical history: He does have high blood pressure and is medicated for this, ADD which she is medicated, does take baclofen as needed for muscle pain. No other significant past medical history reported including surgeries and chronic diseases. Low testosterone, gets hormone replacement. Hypothyroidism. Glaucoma.         Objective   Physical Exam  Musculoskeletal:        Back:    Neurological:      General: No focal deficit present.      Mental Status: He is alert and oriented to person, place, and time.      Cranial Nerves: No dysarthria or facial asymmetry.      Sensory: Sensation is intact.      Motor: Motor function is intact.      Coordination: Coordination is intact.      Gait: Gait is intact. Gait normal.         Palpation of the following regions revealed:  Cervical: Suboccipitals bilateral, muscular hypertonicity.  Scalenes bilateral, muscular hypertonicity.  Upper trapezius bilateral, muscular hypertonicity.  Levator scapulae bilateral, muscular hypertonicity.  Cervical paraspinals bilateral, muscular hypertonicity.  Thoracic: Thoracic paraspinals bilateral, muscular hypertonicity.  Middle trapezius bilateral, muscular hypertonicity.  Lower trapezius bilateral, muscular hypertonicity.  Rhomboids bilateral, muscular hypertonicity.  Lumbar: Lumbar paraspinals bilateral, muscular hypertonicity.  Quadratus lumborum bilateral, muscular hypertonicity.  Gluteal bilateral, muscular hypertonicity.  Piriformis bilateral, " muscular hypertonicity.    Segmental Joint(s): Segmental joint dysfunction was assessed with motion palpation and is identified in the following areas:  Cervical : C3 C5 C6  Thoracic : T4, T6, T7, and T8  Lumbopelvic / Sacral SIJ : L3, L4, L5/S1, and L SIJ    Assessment/Plan   Today's Treatment Included: Spinal manipulation to the following regions of segmental dysfunction identified on examination, using age-appropriate and injury specific force. Segmental Joint(s) Cervical : C3 C5 C6 Segmental Joint(s) Thoracic : T4, T6, T7, and T8 Segmental Joint(s) Lumbopelvic/Sacral SIJ : L3, L4, L5/S1, and L SIJ  Chiropractic manipulation treatment techniques utilized: Diversified CMT, Pelvic drop table technique, and Flexion-distraction technique  Soft tissue mobilization techniques utilized during treatment: Pin and Stretch and Ischemic compression    Soft-tissue mobilization was performed in the following areas:  Suboccipital bilateral, Cervical paraspinal mm. bilateral, Scalenes bilateral, Upper Trapezius bilateral, and Levator Scap. bilateral  Thoracic Paraspinal mm. bilateral, Middle Trapezius bilateral, Lower Trapezius bilateral, and Rhomboids bilateral  Gluteal mm. Glute. Max.  and Glute. Med. bilateral and Piriformis bilateral    Recommended follow-up in 1 month(s).     The patient tolerated today's treatment with little or no additional discomfort and was instructed to contact the office for questions or concerns.

## 2025-03-21 ENCOUNTER — APPOINTMENT (OUTPATIENT)
Dept: PRIMARY CARE | Facility: CLINIC | Age: 65
End: 2025-03-21
Payer: COMMERCIAL

## 2025-03-31 DIAGNOSIS — F41.9 ANXIETY: ICD-10-CM

## 2025-03-31 DIAGNOSIS — Z72.820 POOR SLEEP: ICD-10-CM

## 2025-03-31 DIAGNOSIS — I10 PRIMARY HYPERTENSION: ICD-10-CM

## 2025-03-31 PROCEDURE — RXMED WILLOW AMBULATORY MEDICATION CHARGE

## 2025-04-01 PROCEDURE — RXMED WILLOW AMBULATORY MEDICATION CHARGE

## 2025-04-01 RX ORDER — HYDROXYZINE HYDROCHLORIDE 10 MG/1
10-20 TABLET, FILM COATED ORAL EVERY 8 HOURS PRN
Qty: 90 TABLET | Refills: 0 | Status: SHIPPED | OUTPATIENT
Start: 2025-04-01 | End: 2025-05-01

## 2025-04-01 RX ORDER — LISINOPRIL 30 MG/1
30 TABLET ORAL
Qty: 90 TABLET | Refills: 0 | Status: SHIPPED | OUTPATIENT
Start: 2025-04-01 | End: 2025-05-03

## 2025-04-03 ENCOUNTER — PHARMACY VISIT (OUTPATIENT)
Dept: PHARMACY | Facility: CLINIC | Age: 65
End: 2025-04-03
Payer: COMMERCIAL

## 2025-04-22 ENCOUNTER — APPOINTMENT (OUTPATIENT)
Dept: INTEGRATIVE MEDICINE | Facility: CLINIC | Age: 65
End: 2025-04-22
Payer: COMMERCIAL

## 2025-04-22 DIAGNOSIS — M54.2 NECK PAIN: ICD-10-CM

## 2025-04-22 DIAGNOSIS — M99.01 SEGMENTAL AND SOMATIC DYSFUNCTION OF CERVICAL REGION: Primary | ICD-10-CM

## 2025-04-22 DIAGNOSIS — M79.10 MYALGIA: ICD-10-CM

## 2025-04-22 DIAGNOSIS — M99.04 SEGMENTAL AND SOMATIC DYSFUNCTION OF SACRAL REGION: ICD-10-CM

## 2025-04-22 DIAGNOSIS — G89.29 CHRONIC BILATERAL THORACIC BACK PAIN: ICD-10-CM

## 2025-04-22 DIAGNOSIS — M99.02 SEGMENTAL AND SOMATIC DYSFUNCTION OF THORACIC REGION: ICD-10-CM

## 2025-04-22 DIAGNOSIS — M99.03 SEGMENTAL AND SOMATIC DYSFUNCTION OF LUMBAR REGION: ICD-10-CM

## 2025-04-22 DIAGNOSIS — M54.6 CHRONIC BILATERAL THORACIC BACK PAIN: ICD-10-CM

## 2025-04-22 DIAGNOSIS — M53.3 SACRAL BACK PAIN: ICD-10-CM

## 2025-04-22 PROCEDURE — 98941 CHIROPRACT MANJ 3-4 REGIONS: CPT

## 2025-04-22 NOTE — PROGRESS NOTES
Subjective   Patient ID: Reji Lewis is a 64 y.o. male who presents April 22, 2025 for chiropractic care.     (2/20) [facility]    Today, the patient rates their degree of pain as a 4 out of 10 on the numeric pain rating scale.     Reji has been doing ok, states he is here for another maintenance visit. He has been noticing the tightness in his upper back and neck again, and has also been noticing a decrease in his shoulder ROM on the right side. He wants to start being more active again and get back to the gym but sometimes feels like it is hard to be in a certain gym environment and be comfortable enough to exercise. We discussed looking into new gyms and also seeing if he can do aquatic therapy or pool exercise until he is more comfortable using machines and the treadmill again.   _______________________________________  Last visit (2/26/2025):  Reji Lewis returns today for continued chiropractic care. He was previously in the care of my colleague, Dr. Edwards, who has since left the practice. Reji has noticed his upper back and neck has been worsening again since his last appointment. He has gained some weight and hasn't been as active as he would like to be, and he feels like working on feeling better will help motivate him to start being more active again. We will follow up in one month.   ___________________________________________________________  INITIAL INTAKE/SUBJECTIVE - Dr. Edwards (09/29/21):   He explains that this pain started approximately 1 year ago when he started to notice right shoulder issues. He was scheduled for an MRI of the right shoulder when he was living in [place], however he was unable to tolerate the stress and the coordination of getting there and making it through the test. Pain then seemed to shift to the base of the neck and now it is more pronounced on the left side. Explains that he cannot lay on the right side because the left sided pain will get worse. He does have some  "\"tingling\" sensation at the left base of the neck but the CT junction as well as into the left first and second digits. He notices this specifically when looking down at his phone while in his recliner. The only treatment he has tried so far was a massage approximately 1 month ago which \"felt good\" and gave mild temporary relief. There is no current sharp pain even with active range of motion but he reports the sensation of \"stiffness in his vertebrae\".     Social history: Reji and his wife just moved here approximately 3 months ago to be closer to their son and daughter-in-law. He does note that his pain has gotten worse around this timeframe likely due to stress which has come with moving and is wife starting a new job. 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      Past medical history: He does have high blood pressure and is medicated for this, ADD which she is medicated, does take baclofen as needed for muscle pain. No other significant past medical history reported including surgeries and chronic diseases. Low testosterone, gets hormone replacement. Hypothyroidism. Glaucoma.         Objective   Physical Exam  Musculoskeletal:        Back:    Neurological:      General: No focal deficit present.      Mental Status: He is alert and oriented to person, place, and time.      Cranial Nerves: No dysarthria or facial asymmetry.      Sensory: Sensation is intact.      Motor: Motor function is intact.      Coordination: Coordination is intact.      Gait: Gait is intact. Gait normal.       Palpation of the following regions revealed:  Cervical: Suboccipitals bilateral, muscular hypertonicity.  Scalenes bilateral, muscular hypertonicity.  Upper trapezius bilateral, muscular hypertonicity.  Levator scapulae bilateral, muscular hypertonicity.  Cervical paraspinals bilateral, muscular hypertonicity.  Thoracic: Thoracic paraspinals bilateral, muscular " hypertonicity.  Middle trapezius bilateral, muscular hypertonicity.  Lower trapezius bilateral, muscular hypertonicity.  Rhomboids bilateral, muscular hypertonicity.  Lumbar: Lumbar paraspinals bilateral, muscular hypertonicity.  Quadratus lumborum bilateral, muscular hypertonicity.  Gluteal bilateral, muscular hypertonicity.  Piriformis bilateral, muscular hypertonicity.  Upper Extremity: Pectoralis right, muscular hypertonicity.  Deltoid right, muscular hypertonicity.    Segmental Joint(s): Segmental joint dysfunction was assessed with motion palpation and is identified in the following areas:  Cervical : C3 C5 C6  Thoracic : T4, T6, T7, and T8  Lumbopelvic / Sacral SIJ : L5/S1 and L SIJ    Assessment/Plan   Today's Treatment Included: Spinal manipulation to the following regions of segmental dysfunction identified on examination, using age-appropriate and injury specific force. Segmental Joint(s) Cervical : C3 C5 C6 Segmental Joint(s) Thoracic : T4, T6, T7, and T8 Segmental Joint(s) Lumbopelvic/Sacral SIJ : L3, L4, L5/S1, and L SIJ  Chiropractic manipulation treatment techniques utilized: Diversified CMT, Pelvic drop table technique, and Flexion-distraction technique  Soft tissue mobilization techniques utilized during treatment: Pin and Stretch and Ischemic compression    Soft-tissue mobilization was performed in the following areas:  Suboccipital bilateral, Cervical paraspinal mm. bilateral, Scalenes bilateral, Upper Trapezius bilateral, and Levator Scap. bilateral  Thoracic Paraspinal mm. bilateral, Middle Trapezius bilateral, Lower Trapezius bilateral, Rhomboids bilateral, and Subscapularis bilateral  Gluteal mm. Glute. Max.  and Glute. Med. bilateral and Piriformis bilateral  Deltoid mm. right and Pectoralis mm. right    Recommended follow-up in 1 month(s).     The patient tolerated today's treatment with little or no additional discomfort and was instructed to contact the office for questions or concerns.

## 2025-04-29 ENCOUNTER — APPOINTMENT (OUTPATIENT)
Dept: PRIMARY CARE | Facility: CLINIC | Age: 65
End: 2025-04-29
Payer: COMMERCIAL

## 2025-04-29 VITALS
OXYGEN SATURATION: 96 % | DIASTOLIC BLOOD PRESSURE: 70 MMHG | HEART RATE: 80 BPM | WEIGHT: 315 LBS | SYSTOLIC BLOOD PRESSURE: 122 MMHG | BODY MASS INDEX: 43.73 KG/M2

## 2025-04-29 DIAGNOSIS — R35.0 URINARY FREQUENCY: ICD-10-CM

## 2025-04-29 DIAGNOSIS — E66.01 OBESITY, MORBID, BMI 40.0-49.9 (MULTI): ICD-10-CM

## 2025-04-29 DIAGNOSIS — Z13.1 SCREENING FOR DIABETES MELLITUS: ICD-10-CM

## 2025-04-29 DIAGNOSIS — Z13.220 SCREENING FOR HYPERLIPIDEMIA: ICD-10-CM

## 2025-04-29 DIAGNOSIS — I10 PRIMARY HYPERTENSION: Primary | ICD-10-CM

## 2025-04-29 PROCEDURE — 1160F RVW MEDS BY RX/DR IN RCRD: CPT | Performed by: FAMILY MEDICINE

## 2025-04-29 PROCEDURE — 1036F TOBACCO NON-USER: CPT | Performed by: FAMILY MEDICINE

## 2025-04-29 PROCEDURE — RXMED WILLOW AMBULATORY MEDICATION CHARGE

## 2025-04-29 PROCEDURE — 3074F SYST BP LT 130 MM HG: CPT | Performed by: FAMILY MEDICINE

## 2025-04-29 PROCEDURE — G2211 COMPLEX E/M VISIT ADD ON: HCPCS | Performed by: FAMILY MEDICINE

## 2025-04-29 PROCEDURE — 1159F MED LIST DOCD IN RCRD: CPT | Performed by: FAMILY MEDICINE

## 2025-04-29 PROCEDURE — 3078F DIAST BP <80 MM HG: CPT | Performed by: FAMILY MEDICINE

## 2025-04-29 PROCEDURE — 99214 OFFICE O/P EST MOD 30 MIN: CPT | Performed by: FAMILY MEDICINE

## 2025-04-29 RX ORDER — TADALAFIL 5 MG/1
5 TABLET ORAL DAILY
Qty: 90 TABLET | Refills: 1 | Status: SHIPPED | OUTPATIENT
Start: 2025-04-29 | End: 2026-04-29

## 2025-04-29 RX ORDER — LISINOPRIL 30 MG/1
30 TABLET ORAL
Qty: 90 TABLET | Refills: 1 | Status: SHIPPED | OUTPATIENT
Start: 2025-04-29 | End: 2025-07-28

## 2025-04-29 ASSESSMENT — PATIENT HEALTH QUESTIONNAIRE - PHQ9
1. LITTLE INTEREST OR PLEASURE IN DOING THINGS: NOT AT ALL
2. FEELING DOWN, DEPRESSED OR HOPELESS: NOT AT ALL
SUM OF ALL RESPONSES TO PHQ9 QUESTIONS 1 AND 2: 0

## 2025-04-29 NOTE — PROGRESS NOTES
Subjective   Patient ID: Reji Lewis is a 64 y.o. male who presents for Hypertension (BP follow up ).  He is frustrated by his weight, and recent wt gain.  He feels his diet is okay, fairly healthy and he does not think he overeats.  But he is very sedentary.  He gets winded after about 15 minutes of walking.  He does not follow any structured diet at all.      Histories reviewed      Objective   /70   Pulse 80   Wt 144 kg (318 lb)   SpO2 96%   BMI 43.73 kg/m²    Physical Exam    Alert adult, NAD  Affect pleasant  Heart RRR no murmur  Lungs CTA bilat  Assessment & Plan  Primary hypertension  stable  Orders:    lisinopril 30 mg tablet; TAKE 1 TABLET (30 MG) BY MOUTH ONCE DAILY.    Comprehensive Metabolic Panel; Future    Urinary frequency    Orders:    tadalafil (Cialis) 5 mg tablet; Take 1 tablet (5 mg) by mouth once daily.    Referral to Urology; Future    Screening for hyperlipidemia    Orders:    Lipid Panel; Future    Screening for diabetes mellitus    Orders:    Hemoglobin A1C; Future    Obesity, morbid, BMI 40.0-49.9 (Multi)  I spoke with pt at length about this.  He says he is frustrated by his weight, but I pointed out to him that he seems to be doing absolutely nothing about it.  I encouraged him to at least start walking tyler, but to set goals, keep track of steps or minutes or something, and try to work his way up.       I also told him to please schedule a wellness exam, though he said he did not know he was supposed to do that.

## 2025-04-29 NOTE — ASSESSMENT & PLAN NOTE
stable  Orders:    lisinopril 30 mg tablet; TAKE 1 TABLET (30 MG) BY MOUTH ONCE DAILY.    Comprehensive Metabolic Panel; Future

## 2025-05-05 NOTE — ASSESSMENT & PLAN NOTE
I spoke with pt at length about this.  He says he is frustrated by his weight, but I pointed out to him that he seems to be doing absolutely nothing about it.  I encouraged him to at least start walking tyler, but to set goals, keep track of steps or minutes or something, and try to work his way up.       I also told him to please schedule a wellness exam, though he said he did not know he was supposed to do that.

## 2025-05-12 ENCOUNTER — PHARMACY VISIT (OUTPATIENT)
Dept: PHARMACY | Facility: CLINIC | Age: 65
End: 2025-05-12
Payer: COMMERCIAL

## 2025-05-19 ENCOUNTER — APPOINTMENT (OUTPATIENT)
Dept: INTEGRATIVE MEDICINE | Facility: CLINIC | Age: 65
End: 2025-05-19
Payer: COMMERCIAL

## 2025-06-23 DIAGNOSIS — F41.9 ANXIETY: ICD-10-CM

## 2025-06-23 DIAGNOSIS — Z72.820 POOR SLEEP: ICD-10-CM

## 2025-06-23 PROCEDURE — RXMED WILLOW AMBULATORY MEDICATION CHARGE

## 2025-06-23 RX ORDER — HYDROXYZINE HYDROCHLORIDE 10 MG/1
10-20 TABLET, FILM COATED ORAL EVERY 8 HOURS PRN
Qty: 90 TABLET | Refills: 0 | Status: CANCELLED | OUTPATIENT
Start: 2025-06-23 | End: 2025-07-23

## 2025-06-25 PROCEDURE — RXMED WILLOW AMBULATORY MEDICATION CHARGE

## 2025-06-25 RX ORDER — HYDROXYZINE HYDROCHLORIDE 10 MG/1
10-20 TABLET, FILM COATED ORAL EVERY 8 HOURS PRN
Qty: 90 TABLET | Refills: 1 | Status: SHIPPED | OUTPATIENT
Start: 2025-06-25 | End: 2025-07-25

## 2025-06-27 ENCOUNTER — PHARMACY VISIT (OUTPATIENT)
Dept: PHARMACY | Facility: CLINIC | Age: 65
End: 2025-06-27
Payer: COMMERCIAL

## 2025-06-27 DIAGNOSIS — H40.1131 PRIMARY OPEN ANGLE GLAUCOMA (POAG) OF BOTH EYES, MILD STAGE: ICD-10-CM

## 2025-06-27 PROCEDURE — RXMED WILLOW AMBULATORY MEDICATION CHARGE

## 2025-06-27 RX ORDER — TRAVOPROST OPHTHALMIC SOLUTION 0.04 MG/ML
1 SOLUTION OPHTHALMIC NIGHTLY
Qty: 7.5 ML | Refills: 3 | Status: SHIPPED | OUTPATIENT
Start: 2025-06-27 | End: 2026-06-27

## 2025-07-07 ENCOUNTER — PHARMACY VISIT (OUTPATIENT)
Dept: PHARMACY | Facility: CLINIC | Age: 65
End: 2025-07-07
Payer: COMMERCIAL

## 2025-07-22 ENCOUNTER — PHARMACY VISIT (OUTPATIENT)
Dept: PHARMACY | Facility: CLINIC | Age: 65
End: 2025-07-22
Payer: COMMERCIAL

## 2025-07-22 PROCEDURE — RXMED WILLOW AMBULATORY MEDICATION CHARGE

## 2025-08-06 PROCEDURE — RXMED WILLOW AMBULATORY MEDICATION CHARGE

## 2025-08-12 ENCOUNTER — PHARMACY VISIT (OUTPATIENT)
Dept: PHARMACY | Facility: CLINIC | Age: 65
End: 2025-08-12
Payer: COMMERCIAL

## 2025-08-15 LAB
ALBUMIN SERPL-MCNC: 4.7 G/DL (ref 3.6–5.1)
ALP SERPL-CCNC: 63 U/L (ref 35–144)
ALT SERPL-CCNC: 58 U/L (ref 9–46)
ANION GAP SERPL CALCULATED.4IONS-SCNC: 11 MMOL/L (CALC) (ref 7–17)
AST SERPL-CCNC: 46 U/L (ref 10–35)
BILIRUB SERPL-MCNC: 0.6 MG/DL (ref 0.2–1.2)
BUN SERPL-MCNC: 23 MG/DL (ref 7–25)
CALCIUM SERPL-MCNC: 10.4 MG/DL (ref 8.6–10.3)
CHLORIDE SERPL-SCNC: 100 MMOL/L (ref 98–110)
CHOLEST SERPL-MCNC: 232 MG/DL
CHOLEST/HDLC SERPL: 2.3 (CALC)
CO2 SERPL-SCNC: 28 MMOL/L (ref 20–32)
CREAT SERPL-MCNC: 1.1 MG/DL (ref 0.7–1.35)
EGFRCR SERPLBLD CKD-EPI 2021: 74 ML/MIN/1.73M2
EST. AVERAGE GLUCOSE BLD GHB EST-MCNC: 120 MG/DL
EST. AVERAGE GLUCOSE BLD GHB EST-SCNC: 6.6 MMOL/L
GLUCOSE SERPL-MCNC: 94 MG/DL (ref 65–99)
HBA1C MFR BLD: 5.8 %
HDLC SERPL-MCNC: 99 MG/DL
LDLC SERPL CALC-MCNC: 117 MG/DL (CALC)
NONHDLC SERPL-MCNC: 133 MG/DL (CALC)
POTASSIUM SERPL-SCNC: 5.1 MMOL/L (ref 3.5–5.3)
PROT SERPL-MCNC: 7.5 G/DL (ref 6.1–8.1)
SODIUM SERPL-SCNC: 139 MMOL/L (ref 135–146)
TRIGL SERPL-MCNC: 70 MG/DL

## 2025-08-19 ENCOUNTER — APPOINTMENT (OUTPATIENT)
Dept: PRIMARY CARE | Facility: CLINIC | Age: 65
End: 2025-08-19
Payer: COMMERCIAL

## 2025-08-26 ENCOUNTER — APPOINTMENT (OUTPATIENT)
Dept: OPHTHALMOLOGY | Facility: CLINIC | Age: 65
End: 2025-08-26
Payer: COMMERCIAL

## 2025-08-26 DIAGNOSIS — H40.1131 PRIMARY OPEN ANGLE GLAUCOMA (POAG) OF BOTH EYES, MILD STAGE: Primary | ICD-10-CM

## 2025-08-26 PROCEDURE — 92083 EXTENDED VISUAL FIELD XM: CPT | Performed by: OPHTHALMOLOGY

## 2025-08-26 PROCEDURE — 92133 CPTRZD OPH DX IMG PST SGM ON: CPT | Mod: BILATERAL PROCEDURE | Performed by: OPHTHALMOLOGY

## 2025-08-26 PROCEDURE — 99213 OFFICE O/P EST LOW 20 MIN: CPT | Performed by: OPHTHALMOLOGY

## 2025-08-26 PROCEDURE — RXMED WILLOW AMBULATORY MEDICATION CHARGE

## 2025-08-26 RX ORDER — TRAVOPROST 0.04 MG/ML
1 SOLUTION/ DROPS OPHTHALMIC NIGHTLY
Qty: 7.5 ML | Refills: 3 | Status: SHIPPED | OUTPATIENT
Start: 2025-08-26 | End: 2026-08-26

## 2025-08-26 ASSESSMENT — ENCOUNTER SYMPTOMS
ALLERGIC/IMMUNOLOGIC NEGATIVE: 0
ENDOCRINE NEGATIVE: 0
GASTROINTESTINAL NEGATIVE: 0
EYES NEGATIVE: 1
RESPIRATORY NEGATIVE: 0
CONSTITUTIONAL NEGATIVE: 0
HEMATOLOGIC/LYMPHATIC NEGATIVE: 0
NEUROLOGICAL NEGATIVE: 0
PSYCHIATRIC NEGATIVE: 0
CARDIOVASCULAR NEGATIVE: 0
MUSCULOSKELETAL NEGATIVE: 0

## 2025-08-26 ASSESSMENT — SLIT LAMP EXAM - LIDS
COMMENTS: GOOD POSITION
COMMENTS: GOOD POSITION

## 2025-08-26 ASSESSMENT — TONOMETRY
OS_IOP_MMHG: 19
IOP_METHOD: GOLDMANN APPLANATION
OD_IOP_MMHG: 19

## 2025-08-26 ASSESSMENT — VISUAL ACUITY
OS_SC: 20/20
OD_SC: 20/20
METHOD: SNELLEN - LINEAR

## 2025-08-26 ASSESSMENT — PACHYMETRY
OS_CT(UM): 615
OD_CT(UM): 620

## 2025-08-26 ASSESSMENT — EXTERNAL EXAM - LEFT EYE: OS_EXAM: NORMAL

## 2025-08-26 ASSESSMENT — EXTERNAL EXAM - RIGHT EYE: OD_EXAM: NORMAL

## 2025-08-29 ENCOUNTER — PHARMACY VISIT (OUTPATIENT)
Dept: PHARMACY | Facility: CLINIC | Age: 65
End: 2025-08-29
Payer: COMMERCIAL

## 2025-09-15 ENCOUNTER — APPOINTMENT (OUTPATIENT)
Dept: PRIMARY CARE | Facility: CLINIC | Age: 65
End: 2025-09-15
Payer: COMMERCIAL

## 2026-02-27 ENCOUNTER — APPOINTMENT (OUTPATIENT)
Dept: OPHTHALMOLOGY | Facility: CLINIC | Age: 66
End: 2026-02-27
Payer: COMMERCIAL